# Patient Record
Sex: FEMALE | Race: OTHER | Employment: UNEMPLOYED | URBAN - METROPOLITAN AREA
[De-identification: names, ages, dates, MRNs, and addresses within clinical notes are randomized per-mention and may not be internally consistent; named-entity substitution may affect disease eponyms.]

---

## 2017-10-03 LAB
ANTIBODY SCREEN, EXTERNAL: NEGATIVE
CHLAMYDIA, EXTERNAL: NEGATIVE
HBSAG, EXTERNAL: NEGATIVE
HCT, EXTERNAL: 36.6
HGB, EXTERNAL: 12.4
HIV, EXTERNAL: NEGATIVE
N. GONORRHEA, EXTERNAL: NEGATIVE
RPR, EXTERNAL: NON REACTIVE
RUBELLA, EXTERNAL: NORMAL
TYPE, ABO & RH, EXTERNAL: NORMAL
URINALYSIS, EXTERNAL: NEGATIVE

## 2018-02-20 ENCOUNTER — OFFICE VISIT (OUTPATIENT)
Dept: ENDOCRINOLOGY | Age: 28
End: 2018-02-20

## 2018-02-20 VITALS
TEMPERATURE: 98 F | DIASTOLIC BLOOD PRESSURE: 65 MMHG | SYSTOLIC BLOOD PRESSURE: 99 MMHG | RESPIRATION RATE: 14 BRPM | OXYGEN SATURATION: 99 % | BODY MASS INDEX: 24.87 KG/M2 | HEIGHT: 60 IN | WEIGHT: 126.7 LBS | HEART RATE: 91 BPM

## 2018-02-20 DIAGNOSIS — O24.419 GESTATIONAL DIABETES MELLITUS (GDM) IN THIRD TRIMESTER, GESTATIONAL DIABETES METHOD OF CONTROL UNSPECIFIED: Primary | ICD-10-CM

## 2018-02-20 LAB
GLUCOSE POC: 78 MG/DL
HBA1C MFR BLD HPLC: 5.4 %

## 2018-02-20 NOTE — MR AVS SNAPSHOT
49 [facility] [address] 
[phone] Patient: Chao Wright 
MRN: [id number] TYM:3/06/9746 Visit Information Date & Time Provider Department Dept. Phone Encounter #  
 2/20/2018 11:00 AM Angel Mckenzie MD [facility] [phone] [id number] Follow-up Instructions Return in about 2 weeks (around 3/6/2018). Your Appointments 2/20/2018 11:00 AM  
New Patient with Angel Mckenzie MD  
[facility]) Appt Note: REF: Brim   [phone]        Gest DM  
 [address] [place] [address]  
[phone]  
  
   
 [address] [place] [address] Upcoming Health Maintenance Date Due DTaP/Tdap/Td series (1 - Tdap) 1/30/2012 PAP AKA CERVICAL CYTOLOGY 1/30/2012 Influenza Age 5 to Adult 8/1/2017 OB 3RD TRIMESTER TDAP 12/23/2017 Allergies as of 2/20/2018  Review Complete On: 2/20/2018 By: Angel Mckenzie MD  
 No Known Allergies Current Immunizations  Never Reviewed No immunizations on file. Not reviewed this visit You Were Diagnosed With   
  
 Codes Comments Gestational diabetes mellitus (GDM) in third trimester, gestational diabetes method of control unspecified    -  Primary ICD-10-CM: O23.80 ICD-9-CM: 037.23 Vitals BP Pulse Temp Resp Height(growth percentile) Weight(growth percentile) 99/65 (BP 1 Location: Left arm, BP Patient Position: Sitting) 91 98 °F (36.7 °C) (Oral) 14 5' (1.524 m) 126 lb 11.2 oz (57.5 kg) SpO2 BMI OB Status Smoking Status 99% 24.74 kg/m2 Pregnant Never Smoker Vitals History BMI and BSA Data Body Mass Index Body Surface Area 24.74 kg/m 2 1.56 m 2 Preferred Pharmacy Pharmacy Name Phone  [facility], [facility] [address] AT [address] [place] [address] Rd [address] ([address] [phone] Your Updated Medication List  
  
   
This list is accurate as of: 2/20/18 10:56 AM.  Always use your most recent med list.  
  
  
  
  
 PRENATAL DHA+COMPLETE PRENATAL -300 mg-mcg-mg Cmpk Generic drug:  [id number] lv-folic-dha Take  by mouth. We Performed the Following AMB POC GLUCOSE, QUANTITATIVE, BLOOD [57712 CPT(R)] AMB POC HEMOGLOBIN A1C [09563 CPT(R)] Follow-up Instructions Return in about 2 weeks (around 3/6/2018). Patient Instructions Goals for blood sugar: 
- fasting: less than 90 - 95 
 
- 2 hours after a meal: less than 120 Check blood sugars immediately before brekafast , 2 hour after the meals Introducing [facility] & HE[facility] SERVICES! [employer] introduces eTobb patient portal. Now you can access parts of your medical record, email your doctor's office, and request medication refills online. 1. In your internet browser, go to [email or web address] 2. Click on the First Time User? Click Here link in the Sign In box. You will see the New Member Sign Up page. 3. Enter your eTobb Access Code exactly as it appears below. You will not need to use this code after youve completed the sign-up process. If you do not sign up before the expiration date, you must request a new code. · eTobb Access Code: [email or web address] Expires: 5/21/2018 10:56 AM 
 
4. Enter the last four digits of your Social Security Number (xxxx) and Date of Birth (mm/dd/yyyy) as indicated and click Submit. You will be taken to the next sign-up page. 5. Create a Nimble TVt ID. This will be your eTobb login ID and cannot be changed, so think of one that is secure and easy to remember. 6. Create a eTobb password. You can change your password at any time. 7. Enter your Password Reset Question and Answer. This can be used at a later time if you forget your password. 8. Enter your e-mail address.  You will receive e-mail notification when new information is available in [address] 9. Click Sign Up. You can now view and download portions of your medical record. 10. Click the Download Summary menu link to download a portable copy of your medical information. If you have questions, please visit the Frequently Asked Questions section of the LayerVault website. Remember, LayerVault is NOT to be used for urgent needs. For medical emergencies, dial 911. Now available from your iPhone and Android! Please provide this summary of care documentation to your next provider. Your primary care clinician is listed as Hari Cee. If you have any questions after today's visit, please call [phone].

## 2018-02-20 NOTE — PATIENT INSTRUCTIONS
Goals for blood sugar:  - fasting: less than 90 - 95    - 2 hours after a meal: less than 120    Check blood sugars immediately before brekafast , 2 hour after the meals

## 2018-02-20 NOTE — PROGRESS NOTES
Bravo Culp is a 33 y.o. female here for   Chief Complaint   Patient presents with    New Patient     referred by Dr. Tiffanie Herrmann for GDM       1. Have you been to the ER, urgent care clinic since your last visit? Hospitalized since your last visit? -n/a    2. Have you seen or consulted any other health care providers outside of the [address] [place] [address] since your last visit?   Include any pap smears or colon screening.-n/a    Wt Readings from Last 3 Encounters:   No data found for Wt     Temp Readings from Last 3 Encounters:   No data found for Temp     BP Readings from Last 3 Encounters:   No data found for BP     Pulse Readings from Last 3 Encounters:   No data found for Pulse

## 2018-02-20 NOTE — PROGRESS NOTES
[facility]               Lyssa Olivares MD        [address] 78 [phone] Fax [phone] ( )          Patient Information  Date:2018  Name : Anupama Olivas 33 y.o.     YOB: 1990         Referred by: Ba Sparks MD       History of Present Illness: Anupama Olivas is a 33 y.o. female  here for management of Gestational DM. She is  ,27 weeks gestational age  She had gestational diabetes with her second pregnancy, diet controlled and the baby weight 7 pounds. She had abnormal 1 hour glucose tolerance test which was 146 followed by abnormal 3 hour OGTT. She has changed the diet,    No polyuria polydipsia. She is checking the blood glucose fasting and 2 hours after meal, fastings are less than 90 reportedly, 2 hours later blood glucose depends on what she eats. Past Medical History:   Diagnosis Date    Anxiety and depression        No Known Allergies    Current Outpatient Prescriptions   Medication Sig Dispense Refill    [id number] lv-folic-dha (PRENATAL DHA+COMPLETE PRENATAL) -300 mg-mcg-mg cmpk Take  by mouth.            Reviewed Family Hx,Social Hx  Review of Systems:  - Constitutional Symptoms: no fevers, chills, no weight loss  - Eyes: no blurry vision no double vision  - Cardiovascular: no chest pain or palpitations  - Respiratory: no cough no shortness of breath  - Gastrointestinal: no dysphagia no abdominal pain  - Musculoskeletal: no joint pains no  weakness  - Integumentary: no rashes  - Neurological: no numbness, tingling, no headaches  - Psychiatric: no depression no anxiety  - Endocrine: no heat or cold intolerance, no polyuria or polydipsia    Physical Examination:  Visit Vitals    BP 99/65 (BP 1 Location: Left arm, BP Patient Position: Sitting)    Pulse 91    Temp 98 °F (36.7 °C) (Oral)    Resp 14    Ht 5' (1.524 m)    Wt 126 lb 11.2 oz (57.5 kg)    SpO2 99%    BMI 24.74 kg/m2 -   - General: pleasant, no distress, good eye contact  - HEENT: no exopthalmos, no periorbital edema, EOMI, no lid lag or stare  - Neck: supple, no thyromegaly, masses  - Cardiovascular: regular, normal rate, normal S1 and S2, no murmurs  - Respiratory: clear to auscultation bilaterally  - Gastrointestinal: gravid abdomen  - Musculoskeletal: no proximal muscle weakness in upper or lower extremities  - Integumentary: no edema,   - Neurological:alert and oriented  - Psychiatric: normal mood and affect    Data Reviewed:     [] Glucose records reviewed. [] See glucose records for details (to be scanned). [x] A1C  [] Reviewed labs    Assessment/Plan: 1. Gestational diabetes    The significance and usual management of diabetes complicating pregnancy were discussed, including the increased risks of miscarriage,malformations,increased  section, macrosomia,  delivery, preeclampsia and infections. We discussed the importance of good glucose control to minimize these risks. We also discussed target glucoses and achieving a near normal hemoglobin A1c. Discussed about  checking blood sugars . she did not want dietitian class, understands the carbohydrate portions, she had nutritional class during her second pregnancy. Focused on the importance of checking the blood sugars and sending it to the office for further titration and better control. Thank you for allowing me to participate in the care of this patient.     Natalia Galvin MD

## 2018-03-01 LAB — GRBS, EXTERNAL: NEGATIVE

## 2018-03-07 ENCOUNTER — OFFICE VISIT (OUTPATIENT)
Dept: ENDOCRINOLOGY | Age: 28
End: 2018-03-07

## 2018-03-07 VITALS
HEIGHT: 60 IN | HEART RATE: 86 BPM | TEMPERATURE: 96.5 F | RESPIRATION RATE: 16 BRPM | SYSTOLIC BLOOD PRESSURE: 104 MMHG | WEIGHT: 130.3 LBS | OXYGEN SATURATION: 96 % | DIASTOLIC BLOOD PRESSURE: 60 MMHG | BODY MASS INDEX: 25.58 KG/M2

## 2018-03-07 DIAGNOSIS — O24.419 GESTATIONAL DIABETES MELLITUS (GDM) IN THIRD TRIMESTER, GESTATIONAL DIABETES METHOD OF CONTROL UNSPECIFIED: Primary | ICD-10-CM

## 2018-03-07 RX ORDER — ONDANSETRON HYDROCHLORIDE 8 MG/1
8 TABLET, FILM COATED ORAL
COMMUNITY
End: 2019-07-30 | Stop reason: ALTCHOICE

## 2018-03-07 NOTE — PROGRESS NOTES
Armand Siddiqui is a 33 y.o. female here for   Chief Complaint   Patient presents with    Gestational Diabetes       Functional glucose monitor and record keeping system? - yers  Eye exam within last year? - yes  Foot exam within last year? - yes    1. Have you been to the ER, urgent care clinic since your last visit? Hospitalized since your last visit? - no    2. Have you seen or consulted any other health care providers outside of the [address] [place] [address] since your last visit?   Include any pap smears or colon screening.- OB/GYN      Lab Results   Component Value Date/Time    Hemoglobin A1c (POC) 5.4 02/20/2018 10:36 AM       Wt Readings from Last 3 Encounters:   03/07/18 130 lb 4.8 oz (59.1 kg)   02/20/18 126 lb 11.2 oz (57.5 kg)     Temp Readings from Last 3 Encounters:   03/07/18 96.5 °F (35.8 °C) (Oral)   02/20/18 98 °F (36.7 °C) (Oral)     BP Readings from Last 3 Encounters:   03/07/18 104/60   02/20/18 99/65     Pulse Readings from Last 3 Encounters:   03/07/18 86   02/20/18 91

## 2018-03-07 NOTE — MR AVS SNAPSHOT
[address] [address] 
[phone] Patient: Billy Earsamantha 
MRN: [id number] YBC:1/81/7681 Visit Information Date & Time Provider Department Dept. Phone Encounter #  
 3/7/2018 11:45 AM Rosaura Ordonez MD [facility] [phone] [id number] Follow-up Instructions Return in about 4 months (around 7/7/2018). Upcoming Health Maintenance Date Due DTaP/Tdap/Td series (1 - Tdap) 1/30/2012 PAP AKA CERVICAL CYTOLOGY 1/30/2012 Influenza Age 5 to Adult 8/1/2017 OB 3RD TRIMESTER TDAP 12/23/2017 Allergies as of 3/7/2018  Review Complete On: 3/7/2018 By: Rosaura Ordonez MD  
 No Known Allergies Current Immunizations  Never Reviewed No immunizations on file. Not reviewed this visit Vitals BP Pulse Temp Resp Height(growth percentile) Weight(growth percentile) 104/60 (BP 1 Location: Left arm, BP Patient Position: Sitting) 86 96.5 °F (35.8 °C) (Oral) 16 5' (1.524 m) 130 lb 4.8 oz (59.1 kg) SpO2 BMI OB Status Smoking Status 96% 25.45 kg/m2 Pregnant Never Smoker BMI and BSA Data Body Mass Index Body Surface Area  
 25.45 kg/m 2 1.58 m 2 Preferred Pharmacy Pharmacy Name Phone [facility], [address] [phone] Your Updated Medication List  
  
   
This list is accurate as of 3/7/18 12:14 PM.  Always use your most recent med list.  
  
  
  
  
 PRENATAL DHA+COMPLETE PRENATAL -300 mg-mcg-mg Cmpk Generic drug:  [id number] lv-folic-dha Take  by mouth. ZOFRAN (AS HYDROCHLORIDE) 8 mg tablet Generic drug:  ondansetron hcl Take 8 mg by mouth every eight (8) hours as needed for Nausea. Follow-up Instructions Return in about 4 months (around 7/7/2018). Introducing \[facility]\"" & HEALTH SERVICES!    
 [employer] introduces [facility] patient portal. Now you can access parts of your medical record, email your doctor's office, and request medication refills online. 1. In your internet browser, go to [email or web address] 2. Click on the First Time User? Click Here link in the Sign In box. You will see the New Member Sign Up page. 3. Enter your [facility] Access Code exactly as it appears below. You will not need to use this code after youve completed the sign-up process. If you do not sign up before the expiration date, you must request a new code. · [facility] Access Code: [email or web address] Expires: 5/21/2018 10:56 AM 
 
4. Enter the last four digits of your Social Security Number (xxxx) and Date of Birth (mm/dd/yyyy) as indicated and click Submit. You will be taken to the next sign-up page. 5. Create a [facility] ID. This will be your [facility] login ID and cannot be changed, so think of one that is secure and easy to remember. 6. Create a [facility] password. You can change your password at any time. 7. Enter your Password Reset Question and Answer. This can be used at a later time if you forget your password. 8. Enter your e-mail address. You will receive e-mail notification when new information is available in [address] 9. Click Sign Up. You can now view and download portions of your medical record. 10. Click the Download Summary menu link to download a portable copy of your medical information. If you have questions, please visit the Frequently Asked Questions section of the [facility] website. Remember, [facility] is NOT to be used for urgent needs. For medical emergencies, dial 911. Now available from your iPhone and Android! Please provide this summary of care documentation to your next provider. Your primary care clinician is listed as Timothy Solano. If you have any questions after today's visit, please call [phone].

## 2018-03-07 NOTE — PROGRESS NOTES
[facility] ENDOCRINOLOGY               Vladimir Gonzalez MD        [address] 78 [phone] Fax [phone] ( )          Patient Information  Date:3/7/2018  Name : Arelis Santamaria 33 y.o.     YOB: 1990         Referred by: Ravi Bronson MD       History of Present Illness: Arelis Santamaria is a 33 y.o. female  here for management of Gestational DM. She is  ,40 weeks gestational age  ROB    She had gestational diabetes with her second pregnancy, diet controlled and the baby weight 7 pounds. She had abnormal 1 hour glucose tolerance test which was 146 followed by abnormal 3 hour OGTT.   checking the blood glucose and per recall fasting < 80 , 2 hrs post <110   She lost the logbook, eating healthy      No polyuria polydipsia. She is checking the blood glucose fasting and 2 hours after meal, fastings are less than 90 reportedly, 2 hours later blood glucose depends on what she eats. Past Medical History:   Diagnosis Date    Anxiety and depression        No Known Allergies    Current Outpatient Prescriptions   Medication Sig Dispense Refill    ondansetron hcl (ZOFRAN, AS HYDROCHLORIDE,) 8 mg tablet Take 8 mg by mouth every eight (8) hours as needed for Nausea.  [id number] lv-folic-dha (PRENATAL DHA+COMPLETE PRENATAL) -300 mg-mcg-mg cmpk Take  by mouth.            Reviewed Family Hx,Social Hx  Review of Systems:  - Constitutional Symptoms: no fevers, chills, no weight loss  - Eyes: no blurry vision no double vision  - Cardiovascular: no chest pain or palpitations  - Respiratory: no cough no shortness of breath  - Gastrointestinal: no dysphagia no abdominal pain  - Musculoskeletal: no joint pains no  weakness  - Integumentary: no rashes  - Neurological: no numbness, tingling, no headaches  - Psychiatric: no depression no anxiety  - Endocrine: no heat or cold intolerance, no polyuria or polydipsia    Physical Examination:  Visit Vitals    /60 (BP 1 Location: Left arm, BP Patient Position: Sitting)    Pulse 86    Temp 96.5 °F (35.8 °C) (Oral)    Resp 16    Ht 5' (1.524 m)    Wt 130 lb 4.8 oz (59.1 kg)    SpO2 96%    BMI 25.45 kg/m2     - General: pleasant, no distress, good eye contact  - HEENT: no exopthalmos, no periorbital edema, EOMI, no lid lag or stare  - Neck: supple, no thyromegaly, masses  - Cardiovascular: regular, normal rate, normal S1 and S2, no murmurs  - Respiratory: clear to auscultation bilaterally  - Gastrointestinal: gravid abdomen  - Musculoskeletal: no proximal muscle weakness in upper or lower extremities  - Integumentary: no edema,   - Neurological:alert and oriented  - Psychiatric: normal mood and affect    Data Reviewed:     [] Glucose records reviewed. [] See glucose records for details (to be scanned). [x] A1C  [] Reviewed labs    Assessment/Plan: 1. Gestational diabetes  Diet controlled  Home blood glucose monitoring at goal  Weekly blood glucose log  Follow-up in 3 months after pregnancy          Thank you for allowing me to participate in the care of this patient.     Emily Yee MD

## 2018-03-21 ENCOUNTER — ANESTHESIA (OUTPATIENT)
Dept: LABOR AND DELIVERY | Age: 28
DRG: 560 | End: 2018-03-21
Payer: COMMERCIAL

## 2018-03-21 ENCOUNTER — HOSPITAL ENCOUNTER (INPATIENT)
Age: 28
LOS: 2 days | Discharge: HOME | DRG: 560 | End: 2018-03-23
Attending: OBSTETRICS & GYNECOLOGY | Admitting: OBSTETRICS & GYNECOLOGY
Payer: COMMERCIAL

## 2018-03-21 ENCOUNTER — ANESTHESIA EVENT (OUTPATIENT)
Dept: LABOR AND DELIVERY | Age: 28
DRG: 560 | End: 2018-03-21
Payer: COMMERCIAL

## 2018-03-21 DIAGNOSIS — Z34.90 PREGNANCY, UNSPECIFIED GESTATIONAL AGE: Primary | ICD-10-CM

## 2018-03-21 LAB
ERYTHROCYTE [DISTWIDTH] IN BLOOD BY AUTOMATED COUNT: 12.9 % (ref 11.5–14.5)
GLUCOSE BLD STRIP.AUTO-MCNC: 130 MG/DL (ref 65–100)
GLUCOSE BLD STRIP.AUTO-MCNC: 78 MG/DL (ref 65–100)
HCT VFR BLD AUTO: 37.5 % (ref 35–47)
HGB BLD-MCNC: 12.8 G/DL (ref 11.5–16)
MCH RBC QN AUTO: 30.8 PG (ref 26–34)
MCHC RBC AUTO-ENTMCNC: 34.1 G/DL (ref 30–36.5)
MCV RBC AUTO: 90.4 FL (ref 80–99)
NRBC # BLD: 0 K/UL (ref 0–0.01)
NRBC BLD-RTO: 0 PER 100 WBC
PLATELET # BLD AUTO: 197 K/UL (ref 150–400)
PMV BLD AUTO: 11.1 FL (ref 8.9–12.9)
RBC # BLD AUTO: 4.15 M/UL (ref 3.8–5.2)
SERVICE CMNT-IMP: ABNORMAL
SERVICE CMNT-IMP: NORMAL
WBC # BLD AUTO: 7.6 K/UL (ref 3.6–11)

## 2018-03-21 PROCEDURE — 74011000250 HC RX REV CODE- 250

## 2018-03-21 PROCEDURE — 74011250636 HC RX REV CODE- 250/636: Performed by: OBSTETRICS & GYNECOLOGY

## 2018-03-21 PROCEDURE — 0KQM0ZZ REPAIR PERINEUM MUSCLE, OPEN APPROACH: ICD-10-PCS | Performed by: OBSTETRICS & GYNECOLOGY

## 2018-03-21 PROCEDURE — 75410000000 HC DELIVERY VAGINAL/SINGLE: Performed by: OBSTETRICS & GYNECOLOGY

## 2018-03-21 PROCEDURE — 85027 COMPLETE CBC AUTOMATED: CPT | Performed by: OBSTETRICS & GYNECOLOGY

## 2018-03-21 PROCEDURE — 3E033VJ INTRODUCTION OF OTHER HORMONE INTO PERIPHERAL VEIN, PERCUTANEOUS APPROACH: ICD-10-PCS | Performed by: OBSTETRICS & GYNECOLOGY

## 2018-03-21 PROCEDURE — 77030011943

## 2018-03-21 PROCEDURE — 77030018749 HC HK AMNIO DISP DERY -A

## 2018-03-21 PROCEDURE — 10907ZC DRAINAGE OF AMNIOTIC FLUID, THERAPEUTIC FROM PRODUCTS OF CONCEPTION, VIA NATURAL OR ARTIFICIAL OPENING: ICD-10-PCS | Performed by: OBSTETRICS & GYNECOLOGY

## 2018-03-21 PROCEDURE — 36415 COLL VENOUS BLD VENIPUNCTURE: CPT | Performed by: OBSTETRICS & GYNECOLOGY

## 2018-03-21 PROCEDURE — 77030014125 HC TY EPDRL BBMI -B: Performed by: ANESTHESIOLOGY

## 2018-03-21 PROCEDURE — 75410000002 HC LABOR FEE PER 1 HR: Performed by: OBSTETRICS & GYNECOLOGY

## 2018-03-21 PROCEDURE — 82962 GLUCOSE BLOOD TEST: CPT

## 2018-03-21 PROCEDURE — 74011250637 HC RX REV CODE- 250/637

## 2018-03-21 PROCEDURE — 76060000078 HC EPIDURAL ANESTHESIA: Performed by: NURSE ANESTHETIST, CERTIFIED REGISTERED

## 2018-03-21 PROCEDURE — 74011250637 HC RX REV CODE- 250/637: Performed by: OBSTETRICS & GYNECOLOGY

## 2018-03-21 PROCEDURE — 75410000003 HC RECOV DEL/VAG/CSECN EA 0.5 HR: Performed by: OBSTETRICS & GYNECOLOGY

## 2018-03-21 PROCEDURE — 65270000029 HC RM PRIVATE

## 2018-03-21 PROCEDURE — 00HU33Z INSERTION OF INFUSION DEVICE INTO SPINAL CANAL, PERCUTANEOUS APPROACH: ICD-10-PCS | Performed by: NURSE ANESTHETIST, CERTIFIED REGISTERED

## 2018-03-21 PROCEDURE — 74011250636 HC RX REV CODE- 250/636: Performed by: ANESTHESIOLOGY

## 2018-03-21 PROCEDURE — 4A1HXCZ MONITORING OF PRODUCTS OF CONCEPTION, CARDIAC RATE, EXTERNAL APPROACH: ICD-10-PCS | Performed by: OBSTETRICS & GYNECOLOGY

## 2018-03-21 RX ORDER — SODIUM CHLORIDE, SODIUM LACTATE, POTASSIUM CHLORIDE, CALCIUM CHLORIDE 600; 310; 30; 20 MG/100ML; MG/100ML; MG/100ML; MG/100ML
125 INJECTION, SOLUTION INTRAVENOUS CONTINUOUS
Status: DISCONTINUED | OUTPATIENT
Start: 2018-03-21 | End: 2018-03-21

## 2018-03-21 RX ORDER — OXYTOCIN/RINGER'S LACTATE 20/1000 ML
125-500 PLASTIC BAG, INJECTION (ML) INTRAVENOUS ONCE
Status: ACTIVE | OUTPATIENT
Start: 2018-03-21 | End: 2018-03-22

## 2018-03-21 RX ORDER — ONDANSETRON 2 MG/ML
4 INJECTION INTRAMUSCULAR; INTRAVENOUS
Status: DISCONTINUED | OUTPATIENT
Start: 2018-03-21 | End: 2018-03-23 | Stop reason: HOSPADM

## 2018-03-21 RX ORDER — LIDOCAINE HYDROCHLORIDE 10 MG/ML
10 INJECTION INFILTRATION; PERINEURAL ONCE
Status: DISCONTINUED | OUTPATIENT
Start: 2018-03-21 | End: 2018-03-21

## 2018-03-21 RX ORDER — HYDROMORPHONE HYDROCHLORIDE 2 MG/ML
1 INJECTION, SOLUTION INTRAMUSCULAR; INTRAVENOUS; SUBCUTANEOUS
Status: DISCONTINUED | OUTPATIENT
Start: 2018-03-21 | End: 2018-03-23 | Stop reason: HOSPADM

## 2018-03-21 RX ORDER — FENTANYL/BUPIVACAINE/NS/PF 2-1250MCG
1-16 PREFILLED PUMP RESERVOIR EPIDURAL CONTINUOUS
Status: DISCONTINUED | OUTPATIENT
Start: 2018-03-21 | End: 2018-03-21

## 2018-03-21 RX ORDER — HYDROCORTISONE ACETATE PRAMOXINE HCL 2.5; 1 G/100G; G/100G
CREAM TOPICAL AS NEEDED
Status: DISCONTINUED | OUTPATIENT
Start: 2018-03-21 | End: 2018-03-23 | Stop reason: HOSPADM

## 2018-03-21 RX ORDER — NALOXONE HYDROCHLORIDE 0.4 MG/ML
0.4 INJECTION, SOLUTION INTRAMUSCULAR; INTRAVENOUS; SUBCUTANEOUS AS NEEDED
Status: DISCONTINUED | OUTPATIENT
Start: 2018-03-21 | End: 2018-03-23 | Stop reason: HOSPADM

## 2018-03-21 RX ORDER — IBUPROFEN 800 MG/1
800 TABLET ORAL EVERY 8 HOURS
Status: DISCONTINUED | OUTPATIENT
Start: 2018-03-21 | End: 2018-03-23 | Stop reason: HOSPADM

## 2018-03-21 RX ORDER — SIMETHICONE 80 MG
80 TABLET,CHEWABLE ORAL
Status: DISCONTINUED | OUTPATIENT
Start: 2018-03-21 | End: 2018-03-23 | Stop reason: HOSPADM

## 2018-03-21 RX ORDER — DOCUSATE SODIUM 100 MG/1
100 CAPSULE, LIQUID FILLED ORAL
Status: DISCONTINUED | OUTPATIENT
Start: 2018-03-21 | End: 2018-03-23 | Stop reason: HOSPADM

## 2018-03-21 RX ORDER — EPHEDRINE SULFATE 50 MG/ML
10 INJECTION, SOLUTION INTRAVENOUS
Status: DISCONTINUED | OUTPATIENT
Start: 2018-03-21 | End: 2018-03-21

## 2018-03-21 RX ORDER — BUPIVACAINE HYDROCHLORIDE 2.5 MG/ML
INJECTION, SOLUTION EPIDURAL; INFILTRATION; INTRACAUDAL AS NEEDED
Status: DISCONTINUED | OUTPATIENT
Start: 2018-03-21 | End: 2018-03-21 | Stop reason: HOSPADM

## 2018-03-21 RX ORDER — NALOXONE HYDROCHLORIDE 0.4 MG/ML
0.4 INJECTION, SOLUTION INTRAMUSCULAR; INTRAVENOUS; SUBCUTANEOUS AS NEEDED
Status: DISCONTINUED | OUTPATIENT
Start: 2018-03-21 | End: 2018-03-21 | Stop reason: SDUPTHER

## 2018-03-21 RX ORDER — SODIUM CHLORIDE 0.9 % (FLUSH) 0.9 %
5-10 SYRINGE (ML) INJECTION EVERY 8 HOURS
Status: DISCONTINUED | OUTPATIENT
Start: 2018-03-21 | End: 2018-03-21

## 2018-03-21 RX ORDER — OXYTOCIN IN 5 % DEXTROSE 30/500 ML
1-25 PLASTIC BAG, INJECTION (ML) INTRAVENOUS
Status: DISCONTINUED | OUTPATIENT
Start: 2018-03-21 | End: 2018-03-21

## 2018-03-21 RX ORDER — OXYCODONE AND ACETAMINOPHEN 5; 325 MG/1; MG/1
1 TABLET ORAL
Status: DISCONTINUED | OUTPATIENT
Start: 2018-03-21 | End: 2018-03-23 | Stop reason: HOSPADM

## 2018-03-21 RX ORDER — DIPHENHYDRAMINE HCL 25 MG
25 CAPSULE ORAL
Status: DISCONTINUED | OUTPATIENT
Start: 2018-03-21 | End: 2018-03-23 | Stop reason: HOSPADM

## 2018-03-21 RX ORDER — PEPPERMINT OIL
SPIRIT ORAL
Status: DISCONTINUED
Start: 2018-03-21 | End: 2018-03-21

## 2018-03-21 RX ORDER — NALOXONE HYDROCHLORIDE 0.4 MG/ML
0.4 INJECTION, SOLUTION INTRAMUSCULAR; INTRAVENOUS; SUBCUTANEOUS AS NEEDED
Status: DISCONTINUED | OUTPATIENT
Start: 2018-03-21 | End: 2018-03-21

## 2018-03-21 RX ORDER — SODIUM CHLORIDE 0.9 % (FLUSH) 0.9 %
5-10 SYRINGE (ML) INJECTION AS NEEDED
Status: DISCONTINUED | OUTPATIENT
Start: 2018-03-21 | End: 2018-03-21

## 2018-03-21 RX ADMIN — SODIUM CHLORIDE, POTASSIUM CHLORIDE, SODIUM LACTATE AND CALCIUM CHLORIDE 999 ML/HR: 600; 310; 30; 20 INJECTION, SOLUTION INTRAVENOUS at 10:20

## 2018-03-21 RX ADMIN — Medication 2 MILLI-UNITS/MIN: at 07:23

## 2018-03-21 RX ADMIN — Medication: at 12:20

## 2018-03-21 RX ADMIN — IBUPROFEN 800 MG: 800 TABLET ORAL at 23:41

## 2018-03-21 RX ADMIN — BUPIVACAINE HYDROCHLORIDE 5 ML: 2.5 INJECTION, SOLUTION EPIDURAL; INFILTRATION; INTRACAUDAL at 10:38

## 2018-03-21 RX ADMIN — DOCUSATE SODIUM 100 MG: 100 CAPSULE, LIQUID FILLED ORAL at 15:10

## 2018-03-21 RX ADMIN — IBUPROFEN 800 MG: 800 TABLET ORAL at 15:10

## 2018-03-21 RX ADMIN — Medication 8 ML/HR: at 10:35

## 2018-03-21 RX ADMIN — SODIUM CHLORIDE, POTASSIUM CHLORIDE, SODIUM LACTATE AND CALCIUM CHLORIDE 125 ML/HR: 600; 310; 30; 20 INJECTION, SOLUTION INTRAVENOUS at 06:30

## 2018-03-21 NOTE — PROGRESS NOTES
07: 08AM  Bedside and Verbal shift change report given to Alva Prasad RN (oncoming nurse) by Aide Wilhelm RN (offgoing nurse). Report included the following information SBAR, Kardex, Procedure Summary, Intake/Output, MAR, Recent Results and Med Rec Status. Assumed care of the pt.     8:50 AM  Dr. Olivia Rose in room to assess the pt. SVe performed and pt is 3-4/50/-2, AROM with moderate amount of clear fluid noted, AROM by Dr. Olivia Rose. Patient to have q2hrs accucheck beginning at 10:00AM per Dr. Olivia Rose. Will continue to monitor pt.     10:45 PM  Epidural placed by Yair Davila CRNA; pt now reports pain relief. Will continue to monitor pt.     12:22 PM   of viable male baby by Dr. Olivia Rose at 12:22PM, robust cry heard on delivery, Apgars 9/9. Baby immediately placed skin to skin on the pt.     3:49 PM  TRANSFER - OUT REPORT:    Verbal report given to Raiza Broderick RN(name) on Jory Castro  being transferred to MIU(unit) for routine progression of care       Report consisted of patients Situation, Background, Assessment and   Recommendations(SBAR). Information from the following report(s) SBAR, Kardex, Procedure Summary, Intake/Output, MAR, Recent Results and Med Rec Status was reviewed with the receiving nurse. Lines:   Peripheral IV 18 Right Hand (Active)   Site Assessment Clean, dry, & intact 3/21/2018  7:34 AM   Phlebitis Assessment 0 3/21/2018  7:34 AM   Infiltration Assessment 0 3/21/2018  7:34 AM   Dressing Status Clean, dry, & intact 3/21/2018  7:34 AM   Dressing Type Transparent 3/21/2018  7:34 AM   Hub Color/Line Status Patent; Infusing;Flushed 3/21/2018  7:34 AM   Action Taken Open ports on tubing capped 3/21/2018  7:34 AM   Alcohol Cap Used Yes 3/21/2018  7:34 AM        Opportunity for questions and clarification was provided.   [place] ID bands confirmed with Raiza Broderick RN    Patient transported with all belongings and with baby via open crib:   Registered Nurse  Tech

## 2018-03-21 NOTE — ANESTHESIA PREPROCEDURE EVALUATION
Anesthetic History   No history of anesthetic complications            Review of Systems / Medical History  Patient summary reviewed, nursing notes reviewed and pertinent labs reviewed    Pulmonary  Within defined limits                 Neuro/Psych              Cardiovascular  Within defined limits                     GI/Hepatic/Renal  Within defined limits              Endo/Other    Diabetes: well controlled         Other Findings              Physical Exam    Airway  Mallampati: II  TM Distance: 4 - 6 cm  Neck ROM: normal range of motion   Mouth opening: Normal     Cardiovascular  Regular rate and rhythm,  S1 and S2 normal,  no murmur, click, rub, or gallop  Rhythm: regular  Rate: normal         Dental  No notable dental hx       Pulmonary                 Abdominal  Abdominal exam normal       Other Findings            Anesthetic Plan    ASA: 2  Anesthesia type: epidural            Anesthetic plan and risks discussed with: Patient

## 2018-03-21 NOTE — H&P
History & Physical    Name: Bettina Dewey MRN: [id number]  SSN: [id number]    YOB: 1990  Age: 33 y.o. Sex: female      Subjective:     Estimated Date of Delivery: 3/24/18  OB History    Para Term  AB Living   3 2    2   SAB TAB Ectopic Molar Multiple Live Births              # Outcome Date GA Lbr Randy/2nd Weight Sex Delivery Anes PTL Lv   3 Current            2 Para            1 Para                   Ms. Malone Most is admitted with pregnancy at 39w4d for induction of labor due to gestational diabetes. Prenatal course was complicated by diabetes - gestational.  Please see prenatal records for details. Past Medical History:   Diagnosis Date    Anxiety and depression     taking medication prior to pregnancy    Gestational diabetes     Postpartum depression ,     both previous babies, was treated with medication both times     History reviewed. No pertinent surgical history. Social History     Occupational History    Not on file. Social History Main Topics    Smoking status: Never Smoker    Smokeless tobacco: Never Used    Alcohol use No    Drug use: No    Sexual activity: Yes     Partners: Male     Birth control/ protection: None     Family History   Problem Relation Age of Onset    Hypertension Mother     Hypertension Maternal Grandmother        No Known Allergies  Prior to Admission medications    Medication Sig Start Date End Date Taking? Authorizing Provider   [id number] lv-folic-dha (PRENATAL DHA+COMPLETE PRENATAL) [id number] mg-mcg-mg cmpk Take  by mouth. Yes Historical Provider   ondansetron hcl (ZOFRAN, AS HYDROCHLORIDE,) 8 mg tablet Take 8 mg by mouth every eight (8) hours as needed for Nausea. Historical Provider        Review of Systems: A comprehensive review of systems was negative except for that written in the History of Present Illness.     Objective:     Vitals:  Vitals:    18 0721 18 0726 18 0730 18 0731   BP:   108/63 Pulse:   91    Resp:   14    Temp:   98.3 °F (36.8 °C)    SpO2: 100% 98%  99%   Weight:       Height:            Physical Exam:  Patient without distress. Heart: Regular rate and rhythm  Lung: normal respiratory effort  Abdomen: soft, nontender  Fundus: soft and non tender  Perineum: blood absent, amniotic fluid absent  Cervical Exam: 3-4/50/-2  Lower Extremities: WWP   Membranes:  Artificial Rupture of Membranes; Amniotic Fluid: small amount of clear fluid  Fetal Heart Rate: Reactive          Prenatal Labs:   Lab Results   Component Value Date/Time    Rubella, External immune 10/03/2017    HBsAg, External negative 10/03/2017    HIV, External negative 10/03/2017    RPR, External non reactive 10/03/2017    Gonorrhea, External negative 10/03/2017    Chlamydia, External negative 10/03/2017    ABO,Rh O positive 10/03/2017    GrBStrep, External NEGATIVE 2018       Impression/Plan:     Active Problems:    Pregnancy (3/21/2018)         Plan: Admit for induction of labor. Group B Strep negative.   Pit per protocol   [facility]/[place]  Consented  IVF/Clears  Pain management if desired   Expect    Accuchecks     Signed By:  Dennis Campa MD     2018

## 2018-03-21 NOTE — DISCHARGE INSTRUCTIONS
Discharge Instructions for Vaginal Delivery    Patient ID:  Olan Severe  [id number]  33 y.o.  1990    Take Home Medications       Continue taking your prenatal vitamins if you are breastfeeding. Follow-up care is a key part of your treatment and safety. Please schedule and keep appointments. Follow-up with your primary OB in 6 weeks. Activity  Avoid anything in your vagina for 6 weeks (no intercourse, tampons, or douching). You may drive unless you are taking prescription pain medications. Climbing stairs and light lifting are okay. Please avoid excessive exercise, though walking is okay- you'll be tired! Diet  Regular diet as tolerated. Be sure to drink plenty of fluids if you are breastfeeding. Wound care  If you have stitches, continue to rinse with a squirt bottle of warm water each time you void for about 7-10 days. .  Your stitches will gradually dissolve over four to eight weeks. Sitz baths are also helpful to keep the wound clean, encourage healing, and to help with pain associated with the stitches or hemorrhoids. You can use either a sitz bath basin or a bathtub filled with 2-3\" inches of plain warm water. Soak for 10 minutes 3 times a day as tolerated. Pain Management  1. Over the counter medications such as Tylenol and ibuprofen (Motrin or Advil) are ideal.  These may be taken together, alternating doses. You may  take the maximum dose:  Motrin or Advil (generic ibuprofen), either 3 tablets every 6 hours or 4 tablets every 8 hours or Tylenol (acetominophen) 1000mg every 6 hours (equivalent to 2 extra strength Tylenol). 2. You may also have a precrescription for stronger pain medication. Take only as needed and transition to over the counter medication in the next few days. Minimize amounts of the prescription medication, as it can be habit-forming and will worsen or cause constipation.  Most patients will find that within a couple of days, their pain is adequately controlled using only over-the-counter medications. 3. The prescription pain medication is mixed with Tylenol, therefore, you should not take any extra Tylenol or acetaminophen until you have reduced your prescription pain medication. 4. Add heating pad or sitz baths as needed. Add hemorrhoid wipes or ointments if needed    Constipation  1. Constipation is normal after pregnancy and delivery, especially while taking prescription narcotic pain medication. 2. Over the counter remedies including ducosate (Colace), take 1-2 capsules 1-2 times daily for soft stool as needed. You may also add/ try milk of magnesia or rectal remedies such as Dulcolax or Fleets enema. Recovery: What to Expect at Home  1. Fatigue is expected. Try to rest when you can and don't worry about doing housework or other tasks which can wait. 2. The soreness along your bottom will improve significantly over the first 2 weeks, but it may take 6 weeks before you are completely recovered. 3. Back pain or general body aches or muscle soreness are expected and should improve with acetominophen or ibuprofen. 4. Leg swelling due to pregnancy and/or IV fluids given in the hospital will take about two weeks to resolve. 5. Most women experience some form of the \"Baby Blues\" after having a baby. Feeling emotional, tearful, frustrated, anxious, sad, and irritable some of the time is normal and go away after about 2 weeks. Adequate rest and help from your family will help. Take breaks from caring for the baby. Call your doctor if your symptoms seem severe, last more than 2 weeks, or seem to be getting worse instead of better. Get help immediately if you have thoughts of wanting to hurt yourself or others! Call your doctor or seek immediate medical care if you have:  Heavy vaginal bleeding, soaking through one or more pads an hour for several hours. Foul-smelling discharge from your vagina or incision.   Consistent nausea and vomiting and cannot keep fluids down. Consistent pain that does not get better after you take pain medicine. Sudden chest pain and shortness of breath  Signs of a blood clot: pain/ swelling/ increasing redness in your lower extremeties  Signs of infection: increased pain in your abdomen or vaginal area; red streaks, warmth, or tenderness of your breasts; fever of 100.5 F or greater   Breast Engorgement: Care Instructions  Your Care Instructions    Breast engorgement is the painful overfilling of the breasts that can occur during breastfeeding. It usually occurs when your breasts make more milk than your baby can drink or when you are unable to breastfeed or pump. It also happens when you stop breastfeeding your baby. Breast engorgement can make it hard for your baby to latch on to your nipple. Your baby may then be unable to breastfeed. This makes the problem worse. If you are breastfeeding, engorgement should get better in 12 to 24 hours. And it should disappear within a few days. If you are not breastfeeding, you will get better in 1 to 5 days or when your body stops making breast milk. Follow-up care is a key part of your treatment and safety. Be sure to make and go to all appointments, and call your doctor if you are having problems. It's also a good idea to know your test results and keep a list of the medicines you take. How can you care for yourself at home? · If your doctor gave you medicine, take it exactly as prescribed. Call your doctor if you think you are having a problem with your medicine. · Take an over-the-counter pain medicine, such as acetaminophen (Tylenol), ibuprofen (Advil, Motrin), or naproxen (Aleve). Be safe with medicines. Read and follow all instructions on the label. · Do not take two or more pain medicines at the same time unless the doctor told you to. Many pain medicines have acetaminophen, which is Tylenol. Too much acetaminophen (Tylenol) can be harmful.   · If your baby is having a hard time latching on, let out (express) a small amount of milk with your hands or a pump. This will help soften your nipple and make it easier for your baby to latch on.  · If your breasts are uncomfortably full, pump or express breast milk by hand just until they are comfortable. Do not empty your breasts all the way. Releasing a lot of milk will cause your body to produce larger amounts of milk. This can make breast engorgement worse. · Gently massage your breasts to help milk flow during breastfeeding or pumping. · Apply a frozen wet towel, cold gel or ice packs, or bags of frozen vegetables to your breasts for 15 minutes at a time every hour as needed. (Put a thin cloth between the ice pack and your skin.)  · Avoid tight bras that press on your breasts. A tight bra can cause blocked milk ducts. To prevent breast engorgement  · Put a warm, wet washcloth on your breasts before breastfeeding. This may help your breasts \"let down,\" increasing the flow of milk. Or you can take a warm shower or use a heating pad set on low. (Never use a heating pad in bed, because you may fall asleep and burn yourself.)  · Change your baby's position occasionally to make sure that all parts of your breasts are emptied. · Make sure your baby is latched on properly. · Talk to your doctor or a lactation consultant about any problems you have with breastfeeding. When should you call for help? Call your doctor now or seek immediate medical care if:  ? · You have symptoms of a breast infection, such as:  ¨ Increased pain, swelling, redness, or warmth around a breast.  ¨ Red streaks extending from the breast.  ¨ Pus draining from a breast.  ¨ A fever. ? Watch closely for changes in your health, and be sure to contact your doctor if:  ? · You do not get better as expected. Where can you learn more? Go to http://geraldine-mac.info/.   Enter F017 in the search box to learn more about \"Breast Engorgement: Care Instructions. \"  Current as of: March 16, 2017  Content Version: 11.4  © 8610-8023 [employer]. Care instructions adapted under license by [employer] (which disclaims liability or warranty for this information). If you have questions about a medical condition or this instruction, always ask your healthcare professional. [address] any warranty or liability for your use of this information. Breastfeeding: Care Instructions      Your Care Instructions  Breastfeeding has many benefits. It may lower your baby's chances of getting an infection. It also may prevent your baby from having problems such as diabetes and high cholesterol later in life. Breastfeeding also helps you bond with your baby. The American Academy of Pediatrics recommends breastfeeding for at least a year. That may be very hard for many women to do, but breastfeeding even for a shorter period of time is a health benefit to you and your baby. In the first days after birth, your breasts make a thick, yellow liquid called colostrum. This liquid gives your baby nutrients and antibodies against infection. It is all that babies need in the first days after birth. Your breasts will fill with milk a few days after the birth. Breastfeeding is a skill that gets better with practice. It is common to have some problems. Some women have sore or cracked nipples, blocked milk ducts, or a breast infection (mastitis). But if you feed your baby every 1 to 2 hours during the day and follow the tips on this sheet, you may not have these problems. You can treat these problems if they happen and continue breastfeeding. Follow-up care is a key part of your treatment and safety. Be sure to make and go to all appointments, and call your doctor if you are having problems. It's also a good idea to know your test results and keep a list of the medicines you take. How can you care for yourself at home?   · Breastfeed your baby whenever he or she is hungry. In the first 2 weeks, your baby will feed about every 1 to 3 hours. This will help you keep up your supply of milk. · Put a bed pillow or a nursing pillow on your lap to support your arms and your baby. · Hold your baby in a comfortable position. ¨ You can hold your baby in several ways. One of the most common positions is the cradle hold. One arm supports your baby, with his or her head in the bend of your elbow. Your open hand supports your baby's bottom or back. Your baby's belly lies against yours. ¨ If you had your baby by , or , try the football hold. This position keeps your baby off your belly. Tuck your baby under your arm, with his or her body along the side you will be feeding on. Support your baby's upper body with your arm. With that hand you can control your baby's head to bring his or her mouth to your breast.  ¨ Try different positions with each feeding. If you are having problems, ask for help from your doctor or a lactation consultant. · To get your baby to latch on:  ¨ Support and narrow your breast with one hand using a \"U hold,\" with your thumb on the outer side of your breast and your fingers on the inner side. You can also use a \"C hold,\" with all your fingers below the nipple and your thumb above it. Try the different holds to get the deepest latch for whichever breastfeeding position you use. Your other arm is behind your baby's back, with your hand supporting the base of the baby's head. Position your fingers and thumb to point toward your baby's ears. ¨ You can touch your baby's lower lip with your nipple to get your baby to open his or her mouth. Wait until your baby opens up really wide, like a big yawn. Then be sure to bring the baby quickly to your breast-not your breast to the baby. As you bring your baby toward your breast, use your other hand to support the breast and guide it into his or her mouth.   ¨ Both the nipple and a large portion of the darker area around the nipple (areola) should be in the baby's mouth. The baby's lips should be flared outward, not folded in (inverted). ¨ Listen for a regular sucking and swallowing pattern while the baby is feeding. If you cannot see or hear a swallowing pattern, watch the baby's ears, which will wiggle slightly when the baby swallows. If the baby's nose appears to be blocked by your breast, tilt the baby's head back slightly, so just the edge of one nostril is clear for breathing. ¨ When your baby is latched, you can usually remove your hand from supporting your breast and bring it under your baby to cradle him or her. Now just relax and breastfeed your baby. · You will know that your baby is feeding well when:  ¨ His or her mouth covers a lot of the areola, and the lips are flared out. ¨ His or her chin and nose rest against your breast.  ¨ Sucking is deep and rhythmic, with short pauses. ¨ You are able to see and hear your baby swallowing. ¨ You do not feel pain in your nipple. · If your baby takes only one breast at a feeding, start the next feeding on the other breast.  · Anytime you need to remove your baby from the breast, put one finger in the corner of his or her mouth. Push your finger between your baby's gums to gently break the seal. If you do not break the tight seal before you remove your baby, your nipples can become sore, cracked, or bruised. · After feeding your baby, gently pat his or her back to let out any swallowed air. After your baby burps, offer the breast again, or offer the other breast. Sometimes a baby will want to keep feeding after being burped. When should you call for help? Call your doctor now or seek immediate medical care if:  ? · You have symptoms of a breast infection, such as:  ¨ Increased pain, swelling, redness, or warmth around a breast.  ¨ Red streaks extending from the breast.  ¨ Pus draining from a breast.  ¨ A fever.    ? · Your baby has no wet diapers for 6 hours. ? Watch closely for changes in your health, and be sure to contact your doctor if:  ? · Your baby has trouble latching on to your breast.   ? · You continue to have pain or discomfort when breastfeeding. ? · You have other questions or concerns. Where can you learn more? Go to http://geraldine-mac.info/. Enter P492 in the search box to learn more about \"Breastfeeding: Care Instructions. \"  Current as of: March 16, 2017  Content Version: 11.4  © 5388-9024 [employer]. Care instructions adapted under license by [employer] (which disclaims liability or warranty for this information). If you have questions about a medical condition or this instruction, always ask your healthcare professional. [address] any warranty or liability for your use of this information. Nutrition for Breastfeeding Mothers: Care Instructions  Your Care Instructions    When a woman breastfeeds her baby, she needs more nutrients to keep herself healthy and to make the baby's milk. Breastfeeding helps build the bond between you and your baby. It gives your baby excellent health benefits. A healthy diet includes eating a variety of foods from the basic food groups: grains, vegetables, fruits, milk and milk products (such as cheese and yogurt), and meat and dried beans. Eating well during breastfeeding will ensure that you stay healthy and your baby grows and develops normally. Follow-up care is a key part of your treatment and safety. Be sure to make and go to all appointments, and call your doctor if you are having problems. It's also a good idea to know your test results and keep a list of the medicines you take. How can you care for yourself at home? · Include 3 to 4 cups of nonfat or low-fat milk or milk products in your diet every day. These include:  ¨ Milk (8 ounces equals 1 cup). ¨ Ice cream (1½ cups equals 1 cup of milk).   ¨ Cheese (1½ ounces of cheese equals 1 cup). ¨ Yogurt (8 ounces equals 1 cup). · Eat at least 7 ounces of grains, such as cereals, breads, crackers, rice, or pasta, every day. One ounce is about 1 slice of bread, 1 cup of breakfast cereal, or ½ cup of cooked rice, cereal, or pasta. · Eat 3 cups of vegetables each day. Choices include:  ¨ Dark-green vegetables such as broccoli and spinach. ¨ Orange vegetables such as carrots and sweet potatoes. ¨ Dried beans (such as aranda and kidney beans) and peas (such as lentils). · Every day, eat 2 cups of fresh, frozen, or canned fruit. · Eat 6½ ounces each day of protein, such as chicken, fish, lean meat, eggs, peanut butter, dried beans and peas, nuts, and seeds. One egg, 1 tablespoon of peanut butter, or ½ ounce of nuts or seeds equals 1 ounce of protein. A ½ cup of cooked beans equals 2 ounces of protein. · Drink plenty of fluids, enough so that your urine is light yellow or clear like water. If you have kidney, heart, or liver disease and have to limit fluids, talk with your doctor before you increase the amount of fluids you drink. · Limit caffeine products, such as coffee, tea, chocolate, and some sodas. Caffeine can pass to your baby through breast milk. It may cause fussiness and sleep problems in babies. · Your doctor may recommend a vitamin supplement. Take it as recommended. · Consider joining a breastfeeding support group. These are offered at many hospitals and birthing centers by nurses, nurse-midwives, or lactation consultants. When should you call for help? Watch closely for changes in your health, and be sure to contact your doctor if you have any problems. Where can you learn more? Go to http://geraldine-mac.info/. Enter P234 in the search box to learn more about \"Nutrition for Breastfeeding Mothers: Care Instructions. \"  Current as of: March 16, 2017  Content Version: 11.4  © 3916-1454 [employer], [employer].  Care instructions adapted under license by [employer] (which disclaims liability or warranty for this information). If you have questions about a medical condition or this instruction, always ask your healthcare professional. [address] any warranty or liability for your use of this information. Depression After Childbirth: Care Instructions  Your Care Instructions    Many women get the \"baby blues\" during the first few days after childbirth. You may lose sleep, feel irritable, and cry easily. You may feel happy one minute and sad the next. Hormone changes are one cause of these emotional changes. Also, the demands of a new baby, along with visits from relatives or other family needs, add to a mother's stress. The \"baby blues\" often peak around the fourth day. Then they ease up in less than 2 weeks. If your moodiness or anxiety lasts for more than 2 weeks, or if you feel like life is not worth living, you may have postpartum depression. This is different for each mother. Some mothers with serious depression may worry intensely about their infant's well-being. Others may feel distant from their child. Some mothers might even feel that they might harm their baby. A mother may have signs of paranoia, wondering if someone is watching her. Depression is not a sign of weakness. It is a medical condition that requires treatment. Medicine and counseling often work well to reduce depression. Talk to your doctor about taking antidepressant medicine while breastfeeding. Follow-up care is a key part of your treatment and safety. Be sure to make and go to all appointments, and call your doctor if you are having problems. It's also a good idea to know your test results and keep a list of the medicines you take. How do you know if you are depressed? With all the changes in your life, you may not know if you are depressed.  Pregnancy sometimes causes changes in how you feel that are similar to the symptoms of depression. Symptoms of depression include:  · Feeling sad or hopeless and losing interest in daily activities. These are the most common symptoms of depression. · Sleeping too much or not enough. · Feeling tired. You may feel as if you have no energy. · Eating too much or too little. · Writing or talking about death, such as writing suicide notes or talking about guns, knives, or pills. Keep the numbers for these national suicide hotlines: -TALK (4-760.773.2660) and 4-703-QGTLTFF (2-219.964.9043). If you or someone you know talks about suicide or feeling hopeless, get help right away. How can you care for yourself at home? · Be safe with medicines. Take your medicines exactly as prescribed. Call your doctor if you think you are having a problem with your medicine. · Eat a healthy diet so that you can keep up your energy. · Get regular daily exercise, such as walks, to help improve your mood. · Get as much sunlight as possible. Keep your shades and curtains open. Get outside as much as you can. · Avoid using alcohol or other substances to feel better. · Get as much rest and sleep as possible. Avoid doing too much. Being too tired can increase depression. · Play stimulating music throughout your day and soothing music at night. · Schedule outings and visits with friends and family. Ask them to call you regularly, so that you do not feel alone. · Ask for help with preparing food and other daily tasks. Family and friends are often happy to help a mother with a . · Be honest with yourself and those who care about you. Tell them about your struggle. · Join a support group of new mothers. No one can better understand the challenges of caring for a  than other new mothers. · If you feel like life is not worth living or are feeling hopeless, get help right away.  Keep the numbers for these national suicide hotlines: -TALK (5-945.116.2677) and 3-776-PFSPXEV (3-126.431.1475). When should you call for help? Call 911 anytime you think you may need emergency care. For example, call if:  ? · You feel you cannot stop from hurting yourself, your baby, or someone else. ?Call your doctor now or seek immediate medical care if:  ? · You are having trouble caring for yourself or your baby. ? · You hear voices. ? Watch closely for changes in your health, and be sure to contact your doctor if:  ? · You have problems with your depression medicine. ? · You do not get better as expected. Where can you learn more? Go to http://geraldine-mac.info/. Enter V758 in the search box to learn more about \"Depression After Childbirth: Care Instructions. \"  Current as of: May 12, 2017  Content Version: 11.4    © 3112-4362 [employer], [employer]. Care instructions adapted under license by [employer] (which disclaims liability or warranty for this information). If you have questions about a medical condition or this instruction, always ask your healthcare professional. [address] any warranty or liability for your use of this information.

## 2018-03-21 NOTE — IP AVS SNAPSHOT
[address] 
[phone] Patient: Yadi Mclean 
MRN: [id number] ANW:[id number] A check mary indicates which time of day the medication should be taken. My Medications START taking these medications Instructions Each Dose to Equal  
 Morning Noon Evening Bedtime  
 oxyCODONE-acetaminophen 5-325 mg per tablet Commonly known as:  PERCOCET Your last dose was: Your next dose is: Take 1 Tab by mouth every four (4) hours as needed. Max Daily Amount: 6 Tabs. 1 Tab CONTINUE taking these medications Instructions Each Dose to Equal  
 Morning Noon Evening Bedtime PRENATAL DHA+COMPLETE PRENATAL -300 mg-mcg-mg Cmpk Generic drug:  [id number] lv-folic-dha Your last dose was: Your next dose is: Take  by mouth. ZOFRAN (AS HYDROCHLORIDE) 8 mg tablet Generic drug:  ondansetron hcl Your last dose was: Your next dose is: Take 8 mg by mouth every eight (8) hours as needed for Nausea. 8 mg Where to Get Your Medications Information on where to get these meds will be given to you by the nurse or doctor. ! Ask your nurse or doctor about these medications  
  oxyCODONE-acetaminophen 5-325 mg per tablet

## 2018-03-21 NOTE — IP AVS SNAPSHOT
[address] 
[phone] Patient: Olan Severe 
MRN: [id number] About your hospitalization You were admitted on:  March 21, 2018 You [facility] in the:  [facility] [address] You were discharged on:  March 23, 2018 Why you were hospitalized Your primary diagnosis was:  Not on File Your diagnoses also included:  Pregnancy Follow-up Information Follow up With Details Comments Contact Info Tata Ferguson MD In 6 weeks for postpartum check  [address] [facility] 150 [address] [facility] 
[phone] Discharge Orders None A check mary indicates which time of day the medication should be taken. My Medications START taking these medications Instructions Each Dose to Equal  
 Morning Noon Evening Bedtime  
 oxyCODONE-acetaminophen 5-325 mg per tablet Commonly known as:  PERCOCET Your last dose was: Your next dose is: Take 1 Tab by mouth every four (4) hours as needed. Max Daily Amount: 6 Tabs. 1 Tab CONTINUE taking these medications Instructions Each Dose to Equal  
 Morning Noon Evening Bedtime PRENATAL DHA+COMPLETE PRENATAL -300 mg-mcg-mg Cmpk Generic drug:  [id number] lv-folic-dha Your last dose was: Your next dose is: Take  by mouth. ZOFRAN (AS HYDROCHLORIDE) 8 mg tablet Generic drug:  ondansetron hcl Your last dose was: Your next dose is: Take 8 mg by mouth every eight (8) hours as needed for Nausea. 8 mg Where to Get Your Medications Information on where to get these meds will be given to you by the nurse or doctor. ! Ask your nurse or doctor about these medications  
  oxyCODONE-acetaminophen 5-325 mg per tablet Discharge Instructions Discharge Instructions for Vaginal Delivery Patient ID: 
Aaliyah Tavarez 
[id number] 
33 y.o. 
1990 Take Home Medications Continue taking your prenatal vitamins if you are breastfeeding. Follow-up care is a key part of your treatment and safety. Please schedule and keep appointments. Follow-up with your primary OB in 6 weeks. Activity Avoid anything in your vagina for 6 weeks (no intercourse, tampons, or douching). You may drive unless you are taking prescription pain medications. Climbing stairs and light lifting are okay. Please avoid excessive exercise, though walking is okay- you'll be tired! Diet Regular diet as tolerated. Be sure to drink plenty of fluids if you are breastfeeding. Wound care If you have stitches, continue to rinse with a squirt bottle of warm water each time you void for about 7-10 days. .  Your stitches will gradually dissolve over four to eight weeks. Sitz baths are also helpful to keep the wound clean, encourage healing, and to help with pain associated with the stitches or hemorrhoids. You can use either a sitz bath basin or a bathtub filled with 2-3\" inches of plain warm water. Soak for 10 minutes 3 times a day as tolerated. Pain Management 1. Over the counter medications such as Tylenol and ibuprofen (Motrin or Advil) are ideal.  These may be taken together, alternating doses. You may  take the maximum dose:  Motrin or Advil (generic ibuprofen), either 3 tablets every 6 hours or 4 tablets every 8 hours or Tylenol (acetominophen) 1000mg every 6 hours (equivalent to 2 extra strength Tylenol). 2. You may also have a precrescription for stronger pain medication. Take only as needed and transition to over the counter medication in the next few days. Minimize amounts of the prescription medication, as it can be habit-forming and will worsen or cause constipation.  Most patients will find that within a couple of days, their pain is adequately controlled using only over-the-counter medications. 3. The prescription pain medication is mixed with Tylenol, therefore, you should not take any extra Tylenol or acetaminophen until you have reduced your prescription pain medication. 4. Add heating pad or sitz baths as needed. Add hemorrhoid wipes or ointments if needed Constipation 1. Constipation is normal after pregnancy and delivery, especially while taking prescription narcotic pain medication. 2. Over the counter remedies including ducosate (Colace), take 1-2 capsules 1-2 times daily for soft stool as needed. You may also add/ try milk of magnesia or rectal remedies such as Dulcolax or Fleets enema. Recovery: What to Expect at [facility] 1. Fatigue is expected. Try to rest when you can and don't worry about doing housework or other tasks which can wait. 2. The soreness along your bottom will improve significantly over the first 2 weeks, but it may take 6 weeks before you are completely recovered. 3. Back pain or general body aches or muscle soreness are expected and should improve with acetominophen or ibuprofen. 4. Leg swelling due to pregnancy and/or IV fluids given in the hospital will take about two weeks to resolve. 5. Most women experience some form of the \"Baby Blues\" after having a baby. Feeling emotional, tearful, frustrated, anxious, sad, and irritable some of the time is normal and go away after about 2 weeks. Adequate rest and help from your family will help. Take breaks from caring for the baby. Call your doctor if your symptoms seem severe, last more than 2 weeks, or seem to be getting worse instead of better. Get help immediately if you have thoughts of wanting to hurt yourself or others! Call your doctor or seek immediate medical care if you have: 
Heavy vaginal bleeding, soaking through one or more pads an hour for several hours. Foul-smelling discharge from your vagina or incision.  
Consistent nausea and vomiting and cannot keep fluids down. Consistent pain that does not get better after you take pain medicine. Sudden chest pain and shortness of breath Signs of a blood clot: pain/ swelling/ increasing redness in your lower extremeties Signs of infection: increased pain in your abdomen or vaginal area; red streaks, warmth, or tenderness of your breasts; fever of 100.5 F or greater Breast Engorgement: Care Instructions Your Care Instructions Breast engorgement is the painful overfilling of the breasts that can occur during breastfeeding. It usually occurs when your breasts make more milk than your baby can drink or when you are unable to breastfeed or pump. It also happens when you stop breastfeeding your baby. Breast engorgement can make it hard for your baby to latch on to your nipple. Your baby may then be unable to breastfeed. This makes the problem worse. If you are breastfeeding, engorgement should get better in 12 to 24 hours. And it should disappear within a few days. If you are not breastfeeding, you will get better in 1 to 5 days or when your body stops making breast milk. Follow-up care is a key part of your treatment and safety. Be sure to make and go to all appointments, and call your doctor if you are having problems. It's also a good idea to know your test results and keep a list of the medicines you take. How can you care for yourself at home? · If your doctor gave you medicine, take it exactly as prescribed. Call your doctor if you think you are having a problem with your medicine. · Take an over-the-counter pain medicine, such as acetaminophen (Tylenol), ibuprofen (Advil, Motrin), or naproxen (Aleve). Be safe with medicines. Read and follow all instructions on the label. · Do not take two or more pain medicines at the same time unless the doctor told you to. Many pain medicines have acetaminophen, which is Tylenol. Too much acetaminophen (Tylenol) can be harmful.  
· If your baby is having a hard time latching on, let out (express) a small amount of milk with your hands or a pump. This will help soften your nipple and make it easier for your baby to latch on. 
· If your breasts are uncomfortably full, pump or express breast milk by hand just until they are comfortable. Do not empty your breasts all the way. Releasing a lot of milk will cause your body to produce larger amounts of milk. This can make breast engorgement worse. · Gently massage your breasts to help milk flow during breastfeeding or pumping. · Apply a frozen wet towel, cold gel or ice packs, or bags of frozen vegetables to your breasts for 15 minutes at a time every hour as needed. (Put a thin cloth between the ice pack and your skin.) · Avoid tight bras that press on your breasts. A tight bra can cause blocked milk ducts. To prevent breast engorgement · Put a warm, wet washcloth on your breasts before breastfeeding. This may help your breasts \"let down,\" increasing the flow of milk. Or you can take a warm shower or use a heating pad set on low. (Never use a heating pad in bed, because you may fall asleep and burn yourself.) · Change your baby's position occasionally to make sure that all parts of your breasts are emptied. · Make sure your baby is latched on properly. · Talk to your doctor or a lactation consultant about any problems you have with breastfeeding. When should you call for help? Call your doctor now or seek immediate medical care if: 
? · You have symptoms of a breast infection, such as: 
¨ Increased pain, swelling, redness, or warmth around a breast. 
¨ Red streaks extending from the breast. 
¨ Pus draining from a breast. 
¨ A fever. ? Watch closely for changes in your health, and be sure to contact your doctor if: 
? · You do not get better as expected. Where can you learn more? Go to http://geraldine-mac.info/.  
Enter B349 in the search box to learn more about \"Breast Engorgement: Care Instructions. \" Current as of: March 16, 2017 Content Version: 11.4 © 8696-8454 [employer]. Care instructions adapted under license by [employer] (which disclaims liability or warranty for this information). If you have questions about a medical condition or this instruction, always ask your healthcare professional. [address] any warranty or liability for your use of this information. Breastfeeding: Care Instructions Your Care Instructions Breastfeeding has many benefits. It may lower your baby's chances of getting an infection. It also may prevent your baby from having problems such as diabetes and high cholesterol later in life. Breastfeeding also helps you bond with your baby. The American Academy of Pediatrics recommends breastfeeding for at least a year. That may be very hard for many women to do, but breastfeeding even for a shorter period of time is a health benefit to you and your baby. In the first days after birth, your breasts make a thick, yellow liquid called colostrum. This liquid gives your baby nutrients and antibodies against infection. It is all that babies need in the first days after birth. Your breasts will fill with milk a few days after the birth. Breastfeeding is a skill that gets better with practice. It is common to have some problems. Some women have sore or cracked nipples, blocked milk ducts, or a breast infection (mastitis). But if you feed your baby every 1 to 2 hours during the day and follow the tips on this sheet, you may not have these problems. You can treat these problems if they happen and continue breastfeeding. Follow-up care is a key part of your treatment and safety. Be sure to make and go to all appointments, and call your doctor if you are having problems. It's also a good idea to know your test results and keep a list of the medicines you take. How can you care for yourself at home?  
· Breastfeed your baby whenever he or she is hungry. In the first 2 weeks, your baby will feed about every 1 to 3 hours. This will help you keep up your supply of milk. · Put a bed pillow or a nursing pillow on your lap to support your arms and your baby. · Hold your baby in a comfortable position. ¨ You can hold your baby in several ways. One of the most common positions is the cradle hold. One arm supports your baby, with his or her head in the bend of your elbow. Your open hand supports your baby's bottom or back. Your baby's belly lies against yours. ¨ If you had your baby by , or , try the football hold. This position keeps your baby off your belly. Tuck your baby under your arm, with his or her body along the side you will be feeding on. Support your baby's upper body with your arm. With that hand you can control your baby's head to bring his or her mouth to your breast. 
¨ Try different positions with each feeding. If you are having problems, ask for help from your doctor or a lactation consultant. · To get your baby to latch on: 
¨ Support and narrow your breast with one hand using a \"U hold,\" with your thumb on the outer side of your breast and your fingers on the inner side. You can also use a \"C hold,\" with all your fingers below the nipple and your thumb above it. Try the different holds to get the deepest latch for whichever breastfeeding position you use. Your other arm is behind your baby's back, with your hand supporting the base of the baby's head. Position your fingers and thumb to point toward your baby's ears. ¨ You can touch your baby's lower lip with your nipple to get your baby to open his or her mouth. Wait until your baby opens up really wide, like a big yawn. Then be sure to bring the baby quickly to your breast-not your breast to the baby. As you bring your baby toward your breast, use your other hand to support the breast and guide it into his or her mouth.  
¨ Both the nipple and a large portion of the darker area around the nipple (areola) should be in the baby's mouth. The baby's lips should be flared outward, not folded in (inverted). ¨ Listen for a regular sucking and swallowing pattern while the baby is feeding. If you cannot see or hear a swallowing pattern, watch the baby's ears, which will wiggle slightly when the baby swallows. If the baby's nose appears to be blocked by your breast, tilt the baby's head back slightly, so just the edge of one nostril is clear for breathing. ¨ When your baby is latched, you can usually remove your hand from supporting your breast and bring it under your baby to cradle him or her. Now just relax and breastfeed your baby. · You will know that your baby is feeding well when: 
¨ His or her mouth covers a lot of the areola, and the lips are flared out. ¨ His or her chin and nose rest against your breast. 
¨ Sucking is deep and rhythmic, with short pauses. ¨ You are able to see and hear your baby swallowing. ¨ You do not feel pain in your nipple. · If your baby takes only one breast at a feeding, start the next feeding on the other breast. 
· Anytime you need to remove your baby from the breast, put one finger in the corner of his or her mouth. Push your finger between your baby's gums to gently break the seal. If you do not break the tight seal before you remove your baby, your nipples can become sore, cracked, or bruised. · After feeding your baby, gently pat his or her back to let out any swallowed air. After your baby burps, offer the breast again, or offer the other breast. Sometimes a baby will want to keep feeding after being burped. When should you call for help? Call your doctor now or seek immediate medical care if: 
? · You have symptoms of a breast infection, such as: 
¨ Increased pain, swelling, redness, or warmth around a breast. 
¨ Red streaks extending from the breast. 
¨ Pus draining from a breast. 
¨ A fever. ? · Your baby has no wet diapers for 6 hours. ? Watch closely for changes in your health, and be sure to contact your doctor if: 
? · Your baby has trouble latching on to your breast.  
? · You continue to have pain or discomfort when breastfeeding. ? · You have other questions or concerns. Where can you learn more? Go to http://geraldine-mac.info/. Enter P492 in the search box to learn more about \"Breastfeeding: Care Instructions. \" Current as of: March 16, 2017 Content Version: 11.4 © 0718-6288 [employer]. Care instructions adapted under license by [employer] (which disclaims liability or warranty for this information). If you have questions about a medical condition or this instruction, always ask your healthcare professional. [address] any warranty or liability for your use of this information. Nutrition for Breastfeeding Mothers: Care Instructions Your Care Instructions When a woman breastfeeds her baby, she needs more nutrients to keep herself healthy and to make the baby's milk. Breastfeeding helps build the bond between you and your baby. It gives your baby excellent health benefits. A healthy diet includes eating a variety of foods from the basic food groups: grains, vegetables, fruits, milk and milk products (such as cheese and yogurt), and meat and dried beans. Eating well during breastfeeding will ensure that you stay healthy and your baby grows and develops normally. Follow-up care is a key part of your treatment and safety. Be sure to make and go to all appointments, and call your doctor if you are having problems. It's also a good idea to know your test results and keep a list of the medicines you take. How can you care for yourself at home? · Include 3 to 4 cups of nonfat or low-fat milk or milk products in your diet every day. These include: ¨ Milk (8 ounces equals 1 cup).  
¨ Ice cream (1½ cups equals 1 cup of milk). 
¨ Cheese (1½ ounces of cheese equals 1 cup). ¨ Yogurt (8 ounces equals 1 cup). · Eat at least 7 ounces of grains, such as cereals, breads, crackers, rice, or pasta, every day. One ounce is about 1 slice of bread, 1 cup of breakfast cereal, or ½ cup of cooked rice, cereal, or pasta. · Eat 3 cups of vegetables each day. Choices include: ¨ Dark-green vegetables such as broccoli and spinach. ¨ Orange vegetables such as carrots and sweet potatoes. ¨ Dried beans (such as aranda and kidney beans) and peas (such as lentils). · Every day, eat 2 cups of fresh, frozen, or canned fruit. · Eat 6½ ounces each day of protein, such as chicken, fish, lean meat, eggs, peanut butter, dried beans and peas, nuts, and seeds. One egg, 1 tablespoon of peanut butter, or ½ ounce of nuts or seeds equals 1 ounce of protein. A ½ cup of cooked beans equals 2 ounces of protein. · Drink plenty of fluids, enough so that your urine is light yellow or clear like water. If you have kidney, heart, or liver disease and have to limit fluids, talk with your doctor before you increase the amount of fluids you drink. · Limit caffeine products, such as coffee, tea, chocolate, and some sodas. Caffeine can pass to your baby through breast milk. It may cause fussiness and sleep problems in babies. · Your doctor may recommend a vitamin supplement. Take it as recommended. · Consider joining a breastfeeding support group. These are offered at many hospitals and birthing centers by nurses, nurse-midwives, or lactation consultants. When should you call for help? Watch closely for changes in your health, and be sure to contact your doctor if you have any problems. Where can you learn more? Go to http://geraldine-mac.info/. Enter P234 in the search box to learn more about \"Nutrition for Breastfeeding Mothers: Care Instructions. \" Current as of: March 16, 2017 Content Version: 11.4 © 1644-4797 Healthwise, [facility].  Care instructions adapted under license by [employer] (which disclaims liability or warranty for this information). If you have questions about a medical condition or this instruction, always ask your healthcare professional. [address] any warranty or liability for your use of this information. Depression After Childbirth: Care Instructions Your Care Instructions Many women get the \"baby blues\" during the first few days after childbirth. You may lose sleep, feel irritable, and cry easily. You may feel happy one minute and sad the next. Hormone changes are one cause of these emotional changes. Also, the demands of a new baby, along with visits from relatives or other family needs, add to a mother's stress. The \"baby blues\" often peak around the fourth day. Then they ease up in less than 2 weeks. If your moodiness or anxiety lasts for more than 2 weeks, or if you feel like life is not worth living, you may have postpartum depression. This is different for each mother. Some mothers with serious depression may worry intensely about their infant's well-being. Others may feel distant from their child. Some mothers might even feel that they might harm their baby. A mother may have signs of paranoia, wondering if someone is watching her. Depression is not a sign of weakness. It is a medical condition that requires treatment. Medicine and counseling often work well to reduce depression. Talk to your doctor about taking antidepressant medicine while breastfeeding. Follow-up care is a key part of your treatment and safety. Be sure to make and go to all appointments, and call your doctor if you are having problems. It's also a good idea to know your test results and keep a list of the medicines you take. How do you know if you are depressed? With all the changes in your life, you may not know if you are depressed.  Pregnancy sometimes causes changes in how you feel that are similar to the symptoms of depression. Symptoms of depression include: · Feeling sad or hopeless and losing interest in daily activities. These are the most common symptoms of depression. · Sleeping too much or not enough. · Feeling tired. You may feel as if you have no energy. · Eating too much or too little. · Writing or talking about death, such as writing suicide notes or talking about guns, knives, or pills. Keep the numbers for these national suicide hotlines: 6-585-959-TALK (2-372.321.7435) and 2-579-RRRVXYK (4-169.651.4071). If you or someone you know talks about suicide or feeling hopeless, get help right away. How can you care for yourself at home? · Be safe with medicines. Take your medicines exactly as prescribed. Call your doctor if you think you are having a problem with your medicine. · Eat a healthy diet so that you can keep up your energy. · Get regular daily exercise, such as walks, to help improve your mood. · Get as much sunlight as possible. Keep your shades and curtains open. Get outside as much as you can. · Avoid using alcohol or other substances to feel better. · Get as much rest and sleep as possible. Avoid doing too much. Being too tired can increase depression. · Play stimulating music throughout your day and soothing music at night. · Schedule outings and visits with friends and family. Ask them to call you regularly, so that you do not feel alone. · Ask for help with preparing food and other daily tasks. Family and friends are often happy to help a mother with a . · Be honest with yourself and those who care about you. Tell them about your struggle. · Join a support group of new mothers. No one can better understand the challenges of caring for a  than other new mothers. · If you feel like life is not worth living or are feeling hopeless, get help right away.  Keep the numbers for these national suicide hotlines: 0-266-710-TALK (7-461.897.9281) and 2-326-UTHEQOL (2-371.713.1578). When should you call for help? Call 911 anytime you think you may need emergency care. For example, call if: 
? · You feel you cannot stop from hurting yourself, your baby, or someone else. ?Call your doctor now or seek immediate medical care if: 
? · You are having trouble caring for yourself or your baby. ? · You hear voices. ? Watch closely for changes in your health, and be sure to contact your doctor if: 
? · You have problems with your depression medicine. ? · You do not get better as expected. Where can you learn more? Go to http://geraldine-mac.info/. Enter D422 in the search box to learn more about \"Depression After Childbirth: Care Instructions. \" Current as of: May 12, 2017 Content Version: 11.4 © 1496-8828 [employer]. Care instructions adapted under license by [employer] (which disclaims liability or warranty for this information). If you have questions about a medical condition or this instruction, always ask your healthcare professional. [address] any warranty or liability for your use of this information. TEAM INTERVAL Announcement We are excited to announce that we are making your provider's discharge notes available to you in TEAM INTERVAL. You will see these notes when they are completed and signed by the physician that discharged you from your recent hospital stay. If you have any questions or concerns about any information you see in TEAM INTERVAL, please call the Health Information Department where you were seen or reach out to your Primary Care Provider for more information about your plan of care. Introducing [facility]! [facility] introduces TEAM INTERVAL patient portal. Now you can access parts of your medical record, email your doctor's office, and request medication refills online. 1. In your internet browser, go to [email or web address] 2.  Click on the First Time User? Click Here link in the Sign In box. You will see the New Member Sign Up page. 3. Enter your VtagO Access Code exactly as it appears below. You will not need to use this code after youve completed the sign-up process. If you do not sign up before the expiration date, you must request a new code. · VtagO Access Code: [email or web address] Expires: 5/21/2018 11:56 AM 
 
4. Enter the last four digits of your Social Security Number (xxxx) and Date of Birth (mm/dd/yyyy) as indicated and click Submit. You will be taken to the next sign-up page. 5. Create a VtagO ID. This will be your VtagO login ID and cannot be changed, so think of one that is secure and easy to remember. 6. Create a VtagO password. You can change your password at any time. 7. Enter your Password Reset Question and Answer. This can be used at a later time if you forget your password. 8. Enter your e-mail address. You will receive e-mail notification when new information is available in [address] 9. Click Sign Up. You can now view and download portions of your medical record. 10. Click the Download Summary menu link to download a portable copy of your medical information. If you have questions, please visit the Frequently Asked Questions section of the VtagO website. Remember, VtagO is NOT to be used for urgent needs. For medical emergencies, dial 911. Now available from your iPhone and Android! Providers Seen During Your Hospitalization Provider Specialty Primary office phone Lety Caro MD Obstetrics & Gynecology [phone] Your Primary Care Physician (PCP) Primary Care Physician Office Phone Office Fax Kandy Trujillo [phone] You are allergic to the following No active allergies Recent Documentation Height Weight Breastfeeding? BMI OB Status Smoking Status 1.524 m 59 kg Unknown 25.39 kg/m2 Recent pregnancy Never Smoker Emergency Contacts Name Discharge Info Relation Home Work Mobile Jose Beltran DISCHARGE CAREGIVER [3] Spouse [3] [phone] Patient Belongings The following personal items are in your possession at time of discharge: 
  Dental Appliances: None  Visual Aid: None      Home Medications: None   Jewelry: Earrings, Ring, With patient  Clothing: Footwear, Shirt, Undergarments, Pants, With patient    Other Valuables: At bedside, Cell Phone  Personal Items Sent to Safe: none Please provide this summary of care documentation to your next provider. Signatures-by signing, you are acknowledging that this After Visit Summary has been reviewed with you and you have received a copy. Patient Signature:  ____________________________________________________________ Date:  ____________________________________________________________  
  
Marlyse Leaks Provider Signature:  ____________________________________________________________ Date:  ____________________________________________________________

## 2018-03-21 NOTE — L&D DELIVERY NOTE
Delivery Summary    Patient: Rebeca Combs MRN: [id number]  SSN: [id number]    YOB: 1990  Age: 33 y.o. Sex: female        Labor Events:    Labor: No    Rupture Date: 3/21/2018    Rupture Time: 8:50 AM    Rupture Type AROM    Amniotic Fluid Volume:      Amniotic Fluid Description: Clear  None    Induction: AROM; Oxytocin        Augmentation: None    Labor Complications: None     Additional Complications:        Cervical Ripening:       None      Delivery Events:  Episiotomy: None    Laceration(s): Second degree perineal      Repaired: Yes     Number of Repair Packets: 1    Suture Type and Size:         Estimated Blood Loss (ml): 350        Information for the patient's newbornEarsiobhan De La Cruz Male [[id number]]     Delivery Summary - Baby    Delivery Date: 3/21/2018   Delivery Time: 12:22 PM   Delivery Type: Vaginal, Spontaneous Delivery  Sex:  male  Gestational Age: 43w3d  Delivery Clinician:  Asim Munroe  Living?: Living   Delivery Location: L&D             APGARS  One minute Five minutes Ten minutes   Skin Color: 1    1       Heart Rate: 2   2         Reflex Irritability: 2   2         Muscle Tone: 2   2       Respiration: 2   2         Total: 9   9           Presentation: Vertex  Position: Left Occiput Anterior  Resuscitation Method:  Tactile Stimulation;Suctioning-bulb     Meconium Stained: None    Cord Information: 3 Vessels   Complications: Other(Comment) (Comment)  Cord Blood Sent?:  Yes    Blood Gases Sent?:  No    Placenta:  Date/Time:    Removal: Spontaneous      Appearance: Normal      Measurements:  Birth Weight:      Birth Length:     Head Circumference:       Chest Circumference:      Abdominal Girth:       Other Providers:   DEE MCINTOSH;ALICIA GARLAND;MAGGIE GODDARD;RAISA GUERRA;;;;;; Obstetrician;Primary Nurse;Primary [place] Nurse;Charge Nurse;Neonatologist;Anesthesiologist;Crna;Nurse Practitioner;Midwife;Nursery Nurse           Delivery Note:     Patient reached  and pushed with good effort to deliver the fetal head in GLENROY position. No nuchal cord found. The anterior shoulder, followed by the posterior shoulder and the rest of the body then delivered easily. This was a VMI with Apgars of 9 and 9 at 1 and 5 minutes respectively, weight pending. The infant was placed on mom's abdomen. The cord was then double clamped and cut by the FOB. Cord blood was taken. The placenta followed spontaneously, intact, with 3VC with a true knot. Pitocin was added to the IVF and the fundus was firm to palpation. The vagina, cervix and perineum were examined and a 2nd degree perineal laceration was found and repaired with 2-0 chromic.  mL. No complications. Mom and baby doing well. Dr. Criss Hutson delivering.      Keenan Denney MD  [facility]

## 2018-03-21 NOTE — PROGRESS NOTES
6460: Bedside and Verbal shift change report given to Savanna Barney RN (oncoming nurse) by Tayla Pablo RN (offgoing nurse). Report included the following information SBAR, Kardex, Intake/Output, MAR and Recent Results.

## 2018-03-21 NOTE — PROGRESS NOTES
03/21/18 3:55 PM  CM met with DIANNA's /FOB Zackary ([phone]) to complete initial assessment and to begin discharge planning. Demographics were reviewed and confirmed. DIANNA does not work outside the home, GILLIAN works and will have 2 weeks off from work. The couple has two older children, ages 3 and 1. DIANNA is breastfeeding and has a pump to use at home. [facility]. Patient has car seat, crib, clothing, and other necessary supplies. DIANNA has [address] [facility] and Medicaid services and GILLIAN was educated on how to add the baby to both. Care Management Interventions  PCP Verified by CM:  Yes  Mode of Transport at Discharge: Self  Transition of Care Consult (CM Consult): Discharge Planning  Current Support Network: Lives with Spouse  Confirm Follow Up Transport: Family  Plan discussed with Pt/Family/Caregiver: Yes  Discharge Location  Discharge Placement: Home with family assistance  STEPHANIE Garcia

## 2018-03-21 NOTE — DISCHARGE SUMMARY
Obstetrical Discharge Summary     Name: Kierra Doshi MRN: [id number]  SSN: [id number]    YOB: 1990  Age: 33 y.o. Sex: female      Admit Date: 3/21/2018    Discharge Date: 3/23/18     Attending Physician:  Belen Odonnell MD     Delivering Physician:  Belen Odonnell MD     * Admission Diagnoses:   IUP @ 39w4d   GDM      * Discharge Diagnoses:   Delivery of a VMI via  by Chandrakant Elena MD on 3/21/2018. Apgars were 9 and 9.      2nd degree perineal laceration       Additional Diagnoses:   Hospital Problems as of 3/21/2018  Date Reviewed: 3/7/2018          Codes Class Noted - Resolved POA    Pregnancy ICD-10-CM: Z34.90  ICD-9-CM: V22.2  3/21/2018 - Present Unknown             Lab Results   Component Value Date/Time    Rubella, External immune 10/03/2017    GrBStrep, External NEGATIVE 2018      There is no immunization history on file for this patient. * Procedures:   IOL             * Discharge Condition: good    * Hospital Course: Normal hospital course following the delivery. * Disposition: Home    Discharge Medications:   Current Discharge Medication List          * Follow-up Care/Patient Instructions:   Activity: Activity as tolerated  Diet: Regular Diet  Wound Care: As directed      Followup 6 weeks for PP check        Signed By:  eBlen Odonnell MD     2018

## 2018-03-21 NOTE — PROGRESS NOTES
Bedside and Verbal shift change report given to [address] (oncoming nurse) by Lisa Ling RNC (offgoing nurse). Report included the following information SBAR, Kardex, Intake/Output, MAR and Recent Results.

## 2018-03-21 NOTE — PROGRESS NOTES
Nutrition:    [facility] referral received for Gestational DM. Chart reviewed. 33 yof admitted w/ pregnancy @ 39w4d for IOL. Currently on CLD. BG . No previous hx of DM noted. Nutrition assessment not indicated at this time, will be available by consult if needed. Thank you.     Tc Morris RD, [address] [place] [address]  Clinical Dietitian  [phone]

## 2018-03-21 NOTE — PROGRESS NOTES
1731 Pt is sitting in the bed and she is resting  1812 Pt is sitting in the bed and she is eating lunch

## 2018-03-21 NOTE — ANESTHESIA PROCEDURE NOTES
Epidural Block    Start time: 3/21/2018 10:28 AM  End time: 3/21/2018 10:42 AM  Performed by: Rozina Yi by: Rony Vail     Pre-Procedure  Indication: labor epidural    Preanesthetic Checklist: patient identified, risks and benefits discussed, anesthesia consent, timeout performed and anesthesia consent    Timeout Time: 10:28        Epidural:   Patient position:  Seated  Prep region:  Lumbar  Prep: Betadine    Location:  L3-4    Needle and Epidural Catheter:   Needle Type:  Tuohy  Needle Gauge:  17 G  Injection Technique:  Loss of resistance using air  Attempts:  1  Catheter Size:  18 G  Catheter at Skin Depth (cm):  9  Depth in Epidural Space (cm):  5  Events: no paresthesia and negative aspiration test    Test Dose:  Lidocaine 1.5% w/ epi and negative    Assessment:   Catheter Secured:  Tegaderm and tape  Insertion:  Uncomplicated  Patient tolerance:  Patient tolerated the procedure well with no immediate complications

## 2018-03-21 NOTE — LACTATION NOTE
This note was copied from a baby's chart. Discussed with mother her plan for feeding. Reviewed the benefits of exclusive breast milk feeding during the hospital stay. Informed her of the risks of using formula to supplement in the first few days of life as well as the benefits of successful breast milk feeding; referred her to the Breastfeeding booklet about this information. She acknowledges understanding of information reviewed and states that it is her plan to BF and possibly blend feed with formula for her infant. Will support her choice and offer additional information as needed. Pt will successfully establish breastfeeding by feeding in response to early feeding cues   or wake every 3h, will obtain deep latch, and will keep log of feedings/output. Taught to BF at hunger cues and or q 2-3 hrs and to offer 10-20 drops of hand expressed colostrum at any non-feeds.       Breast Assessment  Left Breast: Small , Medium  Left Nipple: Everted, Intact  Right Breast: Small , Medium  Right Nipple: Everted, Intact  Breast- Feeding Assessment  Attends Breast-Feeding Classes: No (Family's feeding goals discussed, mom verbalizes desire to BF, BF basics, normal  BF behaviors, how milk is made for 3rd child, importance of S2S bonding at breast, positioning that supports infant's innate breast crawl BF behaviors encouraged)  Breast-Feeding Experience: Yes (BF 1st child x 4+ mo, formula fed 2nd child, mom states \"I want to BF this baby\")  Breast Trauma/Surgery: No  Type/Quality: Good (LC visit during [address], Infant feeding w/vigor + ease at left breast; sustained rhythmic suckling pattern noted)  Lactation Consultant Visits  Breast-Feedings: Good   Mother/Infant Observation  Mother Observation: Alignment, Breast comfortable, Recognizes feeding cues, Lets baby end feeding (Mom speaks English hoowever Swiss is her 1st language, English + Swiss BF booklets provided)  Infant Observation: Feeding cues, Rhythmic suck, Relaxed after feeding, Lips flanged, upper, Lips flanged, lower, Latches nipple and aereolae, Opens mouth  LATCH Documentation  Latch: Grasps breast, tongue down, lips flanged, rhythmic sucking  Audible Swallowing: A few with stimulation  Type of Nipple: Everted (after stimulation)  Comfort (Breast/Nipple): Soft/non-tender  Hold (Positioning): Full assist, teach one side, mother does other, staff holds  LATCH Score: 8   Mom states \"he is sucking, but I do not having milk right now, isn't that correct? \"  LC explains colostrum production and purpose in first days of life, encouraged to bond at breast, freely nurse her infant and that formula may be her family's decision however it is not necessary for a healthy, term . Biological Nurturing breastfeeding principles taught. How Biological Nurturing (BN)  promotes optimal breastfeeding (BF) sessions discussed. Mother encouraged to seek comfortable semi-reclining breastfeeding positions. Infant placed frontally along maternal contour. Primitive innate feeding reflexes/behaviors of the  discussed. BN tips and techniques shared; assisted with comfortable breastfeeding positioning. Hand Expression Education:  Mom taught how to manually hand express her colostrum. Emphasized the importance of providing infant with valuable colostrum as infant rests skin to skin at breast.  Aware to avoid extended periods of non-feeding. Aware to offer 10-20+ drops of colostrum every 2-3 hours until infant is latching and nursing effectively. Taught the rationale behind this low tech but highly effective evidence based practice.

## 2018-03-22 LAB
GLUCOSE BLD STRIP.AUTO-MCNC: 106 MG/DL (ref 65–100)
SERVICE CMNT-IMP: ABNORMAL

## 2018-03-22 PROCEDURE — 74011250637 HC RX REV CODE- 250/637: Performed by: OBSTETRICS & GYNECOLOGY

## 2018-03-22 PROCEDURE — 82962 GLUCOSE BLOOD TEST: CPT

## 2018-03-22 PROCEDURE — 65270000029 HC RM PRIVATE

## 2018-03-22 RX ADMIN — IBUPROFEN 800 MG: 800 TABLET ORAL at 16:18

## 2018-03-22 RX ADMIN — IBUPROFEN 800 MG: 800 TABLET ORAL at 07:40

## 2018-03-22 NOTE — ROUTINE PROCESS
Bedside and Verbal shift change report given to BEAN Aguila RN (oncoming nurse) by DOC Gonzales RN (offgoing nurse). Report included the following information SBAR, Kardex, Intake/Output, MAR and Recent Results.

## 2018-03-22 NOTE — LACTATION NOTE
Priscella Sandifer Lactation Consultant Signed  Progress Notes Date of Service: 03/22/18 1130         Problem: Lactation Care Plan  Goal: *Infant latching appropriately  Outcome: Progressing Towards Goal  Pt will successfully establish breastfeeding by feeding in response to infant's early feeding cues and/or to offer breast every 2-3 hours. Ways to obtain a deep latch and seek comfortable positioning shared, aware to keep log of feedings/output. Goal: *Weight loss less than 10% of birth weight  Outcome: Progressing Towards Goal  Pt chooses to do both breast and bottle. Discussed effects of early supplementation on breastfeeding success; may decrease breastmilk production and supply, increase risk for pathological engorgement, baby may develop preference for faster flow from bottles vs breast, and baby's stomach can be stretched if larger volumes of formula are given. Instructed mother to offer baby breast first so that baby gets moms immunity and antibodies     Problem: Patient Education: Go to Patient Education Activity  Goal: Patient/Family Education  Outcome: Progressing Towards Goal  Discussed what to do if nipples get sore. Care for sore/tender nipples discussed:  ways to improve positioning and latch practiced and discussed, hand express colostrum after feedings and let air dry, light application of lanolin, hydrogel pads, seek comfortable laid back feeding position, start feedings on least sore side first.     Comments: Pt will successfully establish breastfeeding by feeding in response to early feeding cues   or wake every 3h, will obtain deep latch, and will keep log of feedings/output.   Taught to BF at hunger cues and or q 2-3 hrs and to offer 10-20 drops of hand expressed colostrum at any non-feeds.       Breast Assessment  Left Breast: Small , Medium  Left Nipple: Everted, Intact  Right Breast: Small , Medium  Right Nipple: Everted, Intact  Breast- Feeding Assessment  Attends Breast-Feeding Classes: No  Breast-Feeding Experience: Yes  Breast Trauma/Surgery: No  Type/Quality: Good (Mother is breast/formula feeding. Instructed mother to offer baby breast 1st so that baby gets her antibodies)  Lactation Consultant Visits  Breast-Feedings: Good  (Baby was latched on and breastfeeding well during visit.  Encouraged mother to wait until baby opens his mouth wide prior to latching on)  Mother/Infant Observation  Mother Observation: Alignment, Breast comfortable, Close hold, Holds breast, Lets baby end feeding, Recognizes feeding cues  Infant Observation: Audible swallows, Frenulum checked, Latches nipple and aereolae, Lips flanged, lower, Lips flanged, upper, Opens mouth, Relaxed after feeding, Rhythmic suck  LATCH Documentation  Latch: Grasps breast, tongue down, lips flanged, rhythmic sucking  Audible Swallowing: A few with stimulation  Type of Nipple: Everted (after stimulation)  Comfort (Breast/Nipple): Soft/non-tender  Hold (Positioning): No assist from staff, mother able to position/hold infant  LATCH Score: 9

## 2018-03-22 NOTE — PROGRESS NOTES
Bedside and Verbal shift change report given to Blanca Bullock RN (oncoming nurse) by Dwain Warner RN (offgoing nurse). Report included the following information SBAR, ED Summary, Procedure Summary, Intake/Output, MAR and Recent Results.

## 2018-03-22 NOTE — PROGRESS NOTES
Post-Partum Day Number 1 Progress Note    Disha Ur       Information for the patient's :  Devin, Male [[id number]]   Vaginal, Spontaneous Delivery   Patient doing well without significant complaint. Voiding without difficulty, normal lochia. Tolerating diet without nausea or vomiting. Pain controlled with oral medications. Vitals:  Visit Vitals    /62 (BP 1 Location: Right arm, BP Patient Position: At rest)    Pulse 65    Temp 98.1 °F (36.7 °C)    Resp 14    Ht 5' (1.524 m)    Wt 59 kg (130 lb)    SpO2 99%    Breastfeeding Unknown    BMI 25.39 kg/m2     Temp (24hrs), Av.3 °F (36.8 °C), Min:98.1 °F (36.7 °C), Max:98.5 °F (36.9 °C)        Exam:   Patient without distress. FF @ U-2 NT                LE NT w/o edema    Labs:     Lab Results   Component Value Date/Time    WBC 7.6 2018 06:28 AM    HGB 12.8 2018 06:28 AM    HCT 37.5 2018 06:28 AM    PLATELET 999 5166 06:28 AM    Hgb, External 12.4 10/03/2017    Hct, External 36.6 10/03/2017     Lab Results   Component Value Date/Time    Rubella, External immune 10/03/2017    GrBStrep, External NEGATIVE 2018    HBsAg, External negative 10/03/2017    HIV, External negative 10/03/2017    RPR, External non reactive 10/03/2017    Gonorrhea, External negative 10/03/2017    Chlamydia, External negative 10/03/2017         Information for the patient's :  Devin, Male [[id number]]   One Minute Apgar: 5 (Filed from Delivery Summary)  Five  Minute Apgar: 5 (Filed from Delivery Summary)        Recent Results (from the past 24 hour(s))   GLUCOSE, POC    Collection Time: 18 10:47 AM   Result Value Ref Range    Glucose (POC) 78 65 - 100 mg/dL    Performed by Yessy           Assessment:   PPD 1 s/p , Doing well and stable  Plan:  1. Continue routine postpartum care  2. GDM -diet controlled, 2hr GTT with Endo PP   3.  Baby boy: desires circ, will perform later today     Ron Brewer DO  3/22/2018  7:45 AM

## 2018-03-23 VITALS
RESPIRATION RATE: 16 BRPM | OXYGEN SATURATION: 99 % | HEIGHT: 60 IN | DIASTOLIC BLOOD PRESSURE: 57 MMHG | WEIGHT: 130 LBS | BODY MASS INDEX: 25.52 KG/M2 | TEMPERATURE: 98.9 F | SYSTOLIC BLOOD PRESSURE: 108 MMHG | HEART RATE: 67 BPM

## 2018-03-23 PROCEDURE — 74011250637 HC RX REV CODE- 250/637: Performed by: OBSTETRICS & GYNECOLOGY

## 2018-03-23 RX ORDER — OXYCODONE AND ACETAMINOPHEN 5; 325 MG/1; MG/1
1 TABLET ORAL
Qty: 10 TAB | Refills: 0 | Status: SHIPPED | OUTPATIENT
Start: 2018-03-23 | End: 2022-05-04

## 2018-03-23 RX ADMIN — DOCUSATE SODIUM 100 MG: 100 CAPSULE, LIQUID FILLED ORAL at 09:45

## 2018-03-23 RX ADMIN — MUPIROCIN: 20 OINTMENT TOPICAL at 06:15

## 2018-03-23 RX ADMIN — IBUPROFEN 800 MG: 800 TABLET ORAL at 00:59

## 2018-03-23 RX ADMIN — IBUPROFEN 800 MG: 800 TABLET ORAL at 09:45

## 2018-03-23 NOTE — ROUTINE PROCESS
Bedside and Verbal shift change report given to KELLI Adames RN (oncoming nurse) by Jerod Jamison (offgoing nurse). Report included the following information SBAR, Procedure Summary, Intake/Output, MAR, Accordion, Recent Results and Med Rec Status.

## 2018-03-23 NOTE — PROGRESS NOTES
PostPartum Note    Willy Hinkle  [id number]  1990  33 y.o.    S:  Ms. Willy Hinkle is a 33 y.o.  PPD #2 s/p  @ 39w4d. Doing well. She had a baby boy. Her lochia is like a period. She describes her pain as mild and is well controlled with PO medications. She is breast feeding and this is going well. She is ambulating and voiding. Tolerating PO intake. O:   Visit Vitals    /57 (BP 1 Location: Right arm, BP Patient Position: At rest)    Pulse 67    Temp 98.9 °F (37.2 °C)    Resp 16    Ht 5' (1.524 m)    Wt 59 kg (130 lb)    SpO2 99%    Breastfeeding Unknown    BMI 25.39 kg/m2       Lab Results   Component Value Date/Time    WBC 7.6 2018 06:28 AM    HGB 12.8 2018 06:28 AM    HCT 37.5 2018 06:28 AM    PLATELET 121  06:28 AM    MCV 90.4 2018 06:28 AM    Hgb, External 12.4 10/03/2017    Hct, External 36.6 10/03/2017       Gen - No acute distress  Abdomen - Fundus firm, below the umbilicus   Ext - Warm, well perfused. Nontender    A/P:  PPD #2 s/p  @ 39w4d doing well. 1.  Routine PP instructions/ care discussed  2. Blood type - Rh +  3. Rubella imm  4. Circumcision done    5. Discharge today    6. F/U 4-6 weeks for PP check.       Matthew Noyola MD  [facility]

## 2018-03-23 NOTE — PROGRESS NOTES
Patient discharged home in stable condition. Discharge paperwork reviewed and signed. Prescriptions given. Instructed patient not to take Percocet and Tylenol together, understanding verbalized and no further medication questions. Patient instructed to follow up with primary OB in 6 weeks. Understanding verbalized. Gift pack given. Band verified with infant's bands. Infant placed in car seat by parents. Patient escorted out by volunteers.

## 2018-03-23 NOTE — LACTATION NOTE
This note was copied from a baby's chart. Pt chooses to do both breast and bottle. Discussed effects of early supplementation on breastfeeding success; may decrease breastmilk production and supply, increase risk for pathological engorgement, baby may develop preference for faster flow from bottles vs breast, and baby's stomach can be stretched if larger volumes of formula are given. Pt will successfully establish breastfeeding by feeding in response to early feeding cues   or wake every 3h, will obtain deep latch, and will keep log of feedings/output. Taught to BF at hunger cues and or q 2-3 hrs and to offer 10-20 drops of hand expressed colostrum at any non-feeds.       Breast Assessment  Left Breast: Small , Medium  Left Nipple: Everted, Painful  Right Breast: Small , Medium  Right Nipple: Everted, Friction damage, Painful  Breast- Feeding Assessment  Attends Breast-Feeding Classes: No  Breast-Feeding Experience: Yes (5-6 months with last child)  Breast Trauma/Surgery: No  Type/Quality: Good  Lactation Consultant Visits  Breast-Feedings: Attempted breast-feeding  Mother/Infant Observation  Mother Observation: Alignment, Breast comfortable, Close hold  Infant Observation: Audible swallows, Breast tissue moves, Feeding cues, Frenulum checked  LATCH Documentation  Latch: Grasps breast, tongue down, lips flanged, rhythmic sucking  Audible Swallowing: A few with stimulation  Type of Nipple: Everted (after stimulation)  Comfort (Breast/Nipple): Filling, red/small blisters/bruises, mild/mod discomfort  Hold (Positioning): Full assist, teach one side, mother does other, staff holds (BN tips shared)  LATCH Score: 7   Care for sore/tender nipples discussed:  ways to improve positioning and latch practiced and discussed, hand express colostrum after feedings and let air dry, light application of lanolin, hydrogel pads, seek comfortable laid back feeding position, start feedings on least sore side first.  Baby appears to have a short frenulum, information re tongue tie given to parents as mother is getting very sore, assisted mother in laid back more comfortable position, encouraged mother if soreness doesn't get better to talk to pediatrician about possibly seeing ENT. Mother has pump at home, baby's current weight loss is -3.4%. Discussed with mother adding pumping as milk is coming in and giving EBM instead of formula and offer baby breast first.  Chart shows numerous feedings (breast and formula), void, stool WNL. Discussed importance of monitoring outputs and feedings on first week of life. Discussed ways to tell if baby is  getting enough breast milk, ie  voids and stools, change in color of stool, and return to birth wt within 2 weeks. Follow up with pediatrician visit for weight check in 1-2 days (per AAP guidelines.)  Encouraged to call [facility]  [phone] or [facility] at [phone] for any questions/problems that arise. Mother also given breastfeeding support group dates and times for any future needs.

## 2018-05-14 ENCOUNTER — OFFICE VISIT (OUTPATIENT)
Dept: ENDOCRINOLOGY | Age: 28
End: 2018-05-14

## 2018-05-14 VITALS
HEART RATE: 85 BPM | HEIGHT: 60 IN | WEIGHT: 120.4 LBS | TEMPERATURE: 97.9 F | OXYGEN SATURATION: 98 % | SYSTOLIC BLOOD PRESSURE: 99 MMHG | BODY MASS INDEX: 23.64 KG/M2 | RESPIRATION RATE: 14 BRPM | DIASTOLIC BLOOD PRESSURE: 66 MMHG

## 2018-05-14 LAB
GLUCOSE POC: 128 MG/DL
HBA1C MFR BLD HPLC: 5.9 %

## 2018-05-14 NOTE — MR AVS SNAPSHOT
[address] [facility] [place] [address] [facility] [address] 
[phone] Patient: Kristin Díaz 
MRN: [id number]:[id number] Visit Information Date & Time Provider Department Dept. Phone Encounter #  
 5/14/2018  2:45 PM Vivian Wilburn MD [facility] [id number] Follow-up Instructions Return if symptoms worsen or fail to improve. Your Appointments 5/14/2018  2:45 PM  
ROUTINE CARE with Vivian Wilburn MD  
[facility] Appt Note: 4mo fu; R/S  [id number] Dr. Gia Julian [address] [place] [address] [place] [address] [facility] [address]  
[phone]  
  
   
 [address] [place] [address] [address] [facility] [address] Upcoming Health Maintenance Date Due DTaP/Tdap/Td series (1 - Tdap) 1/30/2012 PAP AKA CERVICAL CYTOLOGY 1/30/2012 Influenza Age 5 to Adult 8/1/2018 Allergies as of 5/14/2018  Review Complete On: 5/14/2018 By: Nj Morel LPN No Known Allergies Current Immunizations  Reviewed on 3/23/2018 No immunizations on file. Not reviewed this visit You Were Diagnosed With   
  
 Codes Comments Gestational diabetes mellitus (GDM), postpartum    -  Primary ICD-10-CM: O24.439 ICD-9-CM: 648.84 Vitals BP Pulse Temp Resp Height(growth percentile) Weight(growth percentile) 99/66 (BP 1 Location: Right arm, BP Patient Position: Sitting) 85 97.9 °F (36.6 °C) (Oral) 14 5' (1.524 m) 120 lb 6.4 oz (54.6 kg) SpO2 BMI OB Status Smoking Status 98% 23.51 kg/m2 Recent pregnancy Never Smoker Vitals History BMI and BSA Data Body Mass Index Body Surface Area  
 23.51 kg/m 2 1.52 m 2 Preferred Pharmacy Pharmacy Name Phone 310 [facility], [facility] 53 [address] ([address] [phone] Your Updated Medication List  
  
   
This list is accurate as of 5/14/18  2:39 PM.  Always use your most recent med list.  
  
  
  
  
 oxyCODONE-acetaminophen 5-325 mg per tablet Commonly known as:  PERCOCET Take 1 Tab by mouth every four (4) hours as needed. Max Daily Amount: 6 Tabs. PRENATAL DHA+COMPLETE PRENATAL -300 mg-mcg-mg Cmpk Generic drug:  [id number] lv-folic-dha Take  by mouth. ZOFRAN 8 mg tablet Generic drug:  ondansetron hcl Take 8 mg by mouth every eight (8) hours as needed for Nausea. We Performed the Following AMB POC GLUCOSE, QUANTITATIVE, BLOOD [98333 CPT(R)] AMB POC HEMOGLOBIN A1C [91106 CPT(R)] Follow-up Instructions Return if symptoms worsen or fail to improve. Introducing \[facility]\"" & HEALTH SERVICES! Ann Shelton introduces [employer] patient portal. Now you can access parts of your medical record, email your doctor's office, and request medication refills online. 1. In your internet browser, go to [email or web address] 2. Click on the First Time User? Click Here link in the Sign In box. You will see the New Member Sign Up page. 3. Enter your [employer] Access Code exactly as it appears below. You will not need to use this code after youve completed the sign-up process. If you do not sign up before the expiration date, you must request a new code. · [employer] Access Code: [email or web address] Expires: 5/21/2018 11:56 AM 
 
4. Enter the last four digits of your Social Security Number (xxxx) and Date of Birth (mm/dd/yyyy) as indicated and click Submit. You will be taken to the next sign-up page. 5. Create a [employer] ID. This will be your [employer] login ID and cannot be changed, so think of one that is secure and easy to remember. 6. Create a [employer] password. You can change your password at any time. 7. Enter your Password Reset Question and Answer. This can be used at a later time if you forget your password. 8. Enter your e-mail address.  You will receive e-mail notification when new information is available in [address] 9. Click Sign Up. You can now view and download portions of your medical record. 10. Click the Download Summary menu link to download a portable copy of your medical information. If you have questions, please visit the Frequently Asked Questions section of the [employer] website. Remember, [employer] is NOT to be used for urgent needs. For medical emergencies, dial 911. Now available from your iPhone and Android! Please provide this summary of care documentation to your next provider. Your primary care clinician is listed as Meme Evans. If you have any questions after today's visit, please call [phone].

## 2018-05-14 NOTE — LETTER
5/14/2018 10:25 PM 
 
Patient:  Fidel Mac YOB: 1990 Date of Visit: 5/14/2018 Dear Fermin Calvin MD 
[address] [place] [address] [address] [place] [address] Z [id number] VIA Facsimile: [phone] 
 : Thank you for referring Ms. Disha Beltran to me for evaluation/treatment. Below are the relevant portions of my assessment and plan of care. If you have questions, please do not hesitate to call me. I look forward to following Ms. Beltran along with you. Sincerely, Nilsa Menendez MD

## 2018-05-14 NOTE — PROGRESS NOTES
Kendra Noyola MD        [address] 78 [phone] Fax [phone] ( Tue-Fri)          Patient Information  Date:5/14/2018  Name : Cathi Jackson 33 y.o.     YOB: 1990         Referred by: Dawson Santizo MD       History of Present Illness: Cathi Jackson is a 33 y.o. female  here for management of Gestational DM. She delivered a healthy baby 6 weeks ago, lactating  Concentrating on healthy diet  Worried about the future risk of diabetes    Has not checked any blood glucose since delivery      Past Medical History:   Diagnosis Date    Anxiety and depression     taking medication prior to pregnancy    Gestational diabetes     Postpartum depression 2015, 2016    both previous babies, was treated with medication both times       No Known Allergies    Current Outpatient Prescriptions   Medication Sig Dispense Refill    [id number] lv-folic-dha (PRENATAL DHA+COMPLETE PRENATAL) -300 mg-mcg-mg cmpk Take  by mouth.  oxyCODONE-acetaminophen (PERCOCET) 5-325 mg per tablet Take 1 Tab by mouth every four (4) hours as needed. Max Daily Amount: 6 Tabs. 10 Tab 0    ondansetron hcl (ZOFRAN, AS HYDROCHLORIDE,) 8 mg tablet Take 8 mg by mouth every eight (8) hours as needed for Nausea.            Reviewed Family Hx,Social Hx  Review of Systems:  - Constitutional Symptoms: no fevers, chills, no weight loss  - Eyes: no blurry vision no double vision  - Cardiovascular: no chest pain or palpitations  - Respiratory: no cough no shortness of breath  - Gastrointestinal: no dysphagia no abdominal pain  - Musculoskeletal: no joint pains no  weakness  - Integumentary: no rashes  - Neurological: no numbness, tingling, no headaches  - Psychiatric: no depression no anxiety  - Endocrine: no heat or cold intolerance, no polyuria or polydipsia    Physical Examination:  Visit Vitals    BP 99/66 (BP 1 Location: Right arm, BP Patient Position: Sitting)    Pulse 85    Temp 97.9 °F (36.6 °C) (Oral)    Resp 14    Ht 5' (1.524 m)    Wt 120 lb 6.4 oz (54.6 kg)    SpO2 98%    BMI 23.51 kg/m2     - General: pleasant, no distress, good eye contact  - HEENT: no exopthalmos, no periorbital edema, EOMI, no lid lag or stare  - Neck: supple, no thyromegaly, masses  - Cardiovascular: regular, normal rate, normal S1 and S2, no murmurs  - Respiratory: clear to auscultation bilaterally  - Musculoskeletal: no proximal muscle weakness in upper or lower extremities  - Integumentary: no edema,   - Neurological:alert and oriented  - Psychiatric: normal mood and affect    Data Reviewed:     [] Glucose records reviewed. [] See glucose records for details (to be scanned). [x] A1C  [] Reviewed labs    Assessment/Plan: 1. Gestational diabetes  She delivered a healthy baby 6 weeks ago  A1c is 5.9  Discussed about future risk of diabetes and following healthy lifestyle          Thank you for allowing me to participate in the care of this patient.     Angelica Ramon MD

## 2018-05-14 NOTE — PROGRESS NOTES
Rhett Escamilla is a 33 y.o. female here for   Chief Complaint   Patient presents with    Gestational Diabetes       1. Have you been to the ER, urgent care clinic since your last visit? Hospitalized since your last visit? -[facility] March 2018 for delivery    2. Have you seen or consulted any other health care providers outside of the [address] [place] [address] since your last visit? Include any pap smears or colon screening. -OB GYN    Wt Readings from Last 3 Encounters:   03/21/18 130 lb (59 kg)   03/07/18 130 lb 4.8 oz (59.1 kg)   02/20/18 126 lb 11.2 oz (57.5 kg)     Temp Readings from Last 3 Encounters:   03/23/18 98.9 °F (37.2 °C)   03/07/18 96.5 °F (35.8 °C) (Oral)   02/20/18 98 °F (36.7 °C) (Oral)     BP Readings from Last 3 Encounters:   03/23/18 108/57   03/07/18 104/60   02/20/18 99/65     Pulse Readings from Last 3 Encounters:   03/23/18 67   03/07/18 86   02/20/18 91

## 2019-02-05 ENCOUNTER — OFFICE VISIT (OUTPATIENT)
Dept: ENDOCRINOLOGY | Age: 29
End: 2019-02-05

## 2019-02-05 VITALS
HEART RATE: 88 BPM | OXYGEN SATURATION: 99 % | TEMPERATURE: 97.9 F | BODY MASS INDEX: 23.05 KG/M2 | RESPIRATION RATE: 14 BRPM | DIASTOLIC BLOOD PRESSURE: 76 MMHG | SYSTOLIC BLOOD PRESSURE: 119 MMHG | WEIGHT: 117.4 LBS | HEIGHT: 60 IN

## 2019-02-05 DIAGNOSIS — N91.2 AMENORRHEA: Primary | ICD-10-CM

## 2019-02-05 DIAGNOSIS — R00.0 TACHYCARDIA: ICD-10-CM

## 2019-02-05 DIAGNOSIS — E55.9 VITAMIN D DEFICIENCY: ICD-10-CM

## 2019-02-05 PROBLEM — O24.419 GESTATIONAL DIABETES MELLITUS (GDM) IN THIRD TRIMESTER: Status: RESOLVED | Noted: 2018-02-20 | Resolved: 2019-02-05

## 2019-02-05 PROBLEM — Z34.90 PREGNANCY: Status: RESOLVED | Noted: 2018-03-21 | Resolved: 2019-02-05

## 2019-02-05 NOTE — PROGRESS NOTES
Arnold Thorpe is a 30 y.o. female here for Chief Complaint Patient presents with  Thyroid Problem 1. Have you been to the ER, urgent care clinic since your last visit? Hospitalized since your last visit? -no 
 
2. Have you seen or consulted any other health care providers outside of the [address] [place] [address] since your last visit? Include any pap smears or colon screening.-Dr. Citlalli Russell

## 2019-02-05 NOTE — PROGRESS NOTES
[facility] DI[facility] ENDOCRINOLOGY Angelica Ramon MD 
 
    [address] [place] [address] WN:[phone] Fax [phone] ( Tue-Fri) Patient Information Date:2/5/2019 Name : Brian Zhou 30 y.o.    
YOB: 1990 Referred by: Ramiro Smith MD  
 
 
History of Present Illness: Brian Zhou is a 30 y.o. female  here for management of Gestational DM. She is 11 months postpartum, complains of intermittent palpitations, lower abdominal pain, was told to have UTI. No weight loss Has not had any labs. She was on antibiotics for possible UTI Complains of generalized arthralgia Past Medical History:  
Diagnosis Date  Anxiety and depression   
 taking medication prior to pregnancy  Gestational diabetes  Postpartum depression 2015, 2016  
 both previous babies, was treated with medication both times No Known Allergies Current Outpatient Medications Medication Sig Dispense Refill  oxyCODONE-acetaminophen (PERCOCET) 5-325 mg per tablet Take 1 Tab by mouth every four (4) hours as needed. Max Daily Amount: 6 Tabs. 10 Tab 0  
 ondansetron hcl (ZOFRAN, AS HYDROCHLORIDE,) 8 mg tablet Take 8 mg by mouth every eight (8) hours as needed for Nausea.  [id number] lv-folic-dha (PRENATAL DHA+COMPLETE PRENATAL) -300 mg-mcg-mg cmpk Take  by mouth. Reviewed Family Hx,Social Hx Review of Systems: 
- Review of symptoms negative except as mentioned in HPI Physical Examination: 
Visit Vitals /76 (BP 1 Location: Left arm, BP Patient Position: Sitting) Pulse 88 Temp 97.9 °F (36.6 °C) (Oral) Resp 14 Ht 5' (1.524 m) Wt 117 lb 6.4 oz (53.3 kg) LMP  (LMP Unknown) SpO2 99% BMI 22.93 kg/m² - General: pleasant, no distress, good eye contact 
- HEENT: no exopthalmos, no periorbital edema, EOMI, no lid lag or stare 
- Neck: supple, no thyromegaly, masses - Cardiovascular: regular, normal rate, normal S1 and S2, no murmurs - Respiratory: clear to auscultation bilaterally - Musculoskeletal: no proximal muscle weakness in upper or lower extremities - Integumentary: no edema,  
- Neurological:alert and oriented - Psychiatric: normal mood and affect Data Reviewed:  
 
 
Assessment/Plan:  
 
Menstrual irregularity: After the delivery she had only one cycle in December 2018, delivered a healthy baby in March 2018 Not lactating Denies pregnancy Fatigue, tachycardia: Check CBC, TFTs She does check blood glucose intermittently, less than 100 Discussed about the daily vitamin D, calcium intake Self-pay, have to limit the number of labs ordered Thank you for allowing me to participate in the care of this patient. Amanda Rosenbaum MD 
 
 
Patient verbalized understanding

## 2019-02-13 LAB
25(OH)D3+25(OH)D2 SERPL-MCNC: 9.8 NG/ML (ref 30–100)
BASOPHILS # BLD AUTO: 0 X10E3/UL (ref 0–0.2)
BASOPHILS NFR BLD AUTO: 0 %
BUN SERPL-MCNC: 10 MG/DL (ref 6–20)
BUN/CREAT SERPL: 19 (ref 9–23)
CALCIUM SERPL-MCNC: 9.7 MG/DL (ref 8.7–10.2)
CHLORIDE SERPL-SCNC: 104 MMOL/L (ref 96–106)
CO2 SERPL-SCNC: 24 MMOL/L (ref 20–29)
CREAT SERPL-MCNC: 0.52 MG/DL (ref 0.57–1)
EOSINOPHIL # BLD AUTO: 0.1 X10E3/UL (ref 0–0.4)
EOSINOPHIL NFR BLD AUTO: 2 %
ERYTHROCYTE [DISTWIDTH] IN BLOOD BY AUTOMATED COUNT: 13.2 % (ref 12.3–15.4)
ESTRADIOL SERPL-MCNC: 30.1 PG/ML
FSH SERPL-ACNC: 3.7 MIU/ML
GLUCOSE SERPL-MCNC: 161 MG/DL (ref 65–99)
HCT VFR BLD AUTO: 41.8 % (ref 34–46.6)
HGB BLD-MCNC: 13.3 G/DL (ref 11.1–15.9)
IMM GRANULOCYTES # BLD AUTO: 0 X10E3/UL (ref 0–0.1)
IMM GRANULOCYTES NFR BLD AUTO: 0 %
LYMPHOCYTES # BLD AUTO: 1.7 X10E3/UL (ref 0.7–3.1)
LYMPHOCYTES NFR BLD AUTO: 31 %
MCH RBC QN AUTO: 28.7 PG (ref 26.6–33)
MCHC RBC AUTO-ENTMCNC: 31.8 G/DL (ref 31.5–35.7)
MCV RBC AUTO: 90 FL (ref 79–97)
MONOCYTES # BLD AUTO: 0.3 X10E3/UL (ref 0.1–0.9)
MONOCYTES NFR BLD AUTO: 6 %
NEUTROPHILS # BLD AUTO: 3.4 X10E3/UL (ref 1.4–7)
NEUTROPHILS NFR BLD AUTO: 61 %
PLATELET # BLD AUTO: 355 X10E3/UL (ref 150–379)
POTASSIUM SERPL-SCNC: 4.1 MMOL/L (ref 3.5–5.2)
RBC # BLD AUTO: 4.63 X10E6/UL (ref 3.77–5.28)
SODIUM SERPL-SCNC: 143 MMOL/L (ref 134–144)
T4 FREE SERPL-MCNC: 1.14 NG/DL (ref 0.82–1.77)
TSH SERPL DL<=0.005 MIU/L-ACNC: 1.02 UIU/ML (ref 0.45–4.5)
WBC # BLD AUTO: 5.6 X10E3/UL (ref 3.4–10.8)

## 2019-02-14 ENCOUNTER — TELEPHONE (OUTPATIENT)
Dept: ENDOCRINOLOGY | Age: 29
End: 2019-02-14

## 2019-02-15 NOTE — TELEPHONE ENCOUNTER
Vit D is very low    OTC Vitamin D 2000 units daily     Sugar is little high - ask her to check fasting and bedtime BG for a week and if high to call back     Rest of the tests normal

## 2019-02-15 NOTE — TELEPHONE ENCOUNTER
Informed pt that Vit D is very low, asked that she take Vit D OTC 2000 units daily. Explained BG was high during blood draw , asked that she check BG fasting in the AM and before bed for one week, If sugars are still high, call the office. Also informed her that tests are normal. Pt verbalized understanding.

## 2019-06-29 ENCOUNTER — ED HISTORICAL/CONVERTED ENCOUNTER (OUTPATIENT)
Dept: OTHER | Age: 29
End: 2019-06-29

## 2019-07-20 ENCOUNTER — ED HISTORICAL/CONVERTED ENCOUNTER (OUTPATIENT)
Dept: OTHER | Age: 29
End: 2019-07-20

## 2019-07-30 ENCOUNTER — OFFICE VISIT (OUTPATIENT)
Dept: SURGERY | Age: 29
End: 2019-07-30

## 2019-07-30 VITALS
HEART RATE: 94 BPM | BODY MASS INDEX: 22.58 KG/M2 | HEIGHT: 60 IN | DIASTOLIC BLOOD PRESSURE: 48 MMHG | RESPIRATION RATE: 14 BRPM | TEMPERATURE: 98.4 F | WEIGHT: 115 LBS | OXYGEN SATURATION: 99 % | SYSTOLIC BLOOD PRESSURE: 89 MMHG

## 2019-07-30 DIAGNOSIS — K80.20 SYMPTOMATIC CHOLELITHIASIS: Primary | ICD-10-CM

## 2019-07-30 PROBLEM — F41.9 ANXIETY: Status: ACTIVE | Noted: 2019-07-30

## 2019-07-30 PROBLEM — F32.A DEPRESSION: Status: ACTIVE | Noted: 2019-07-30

## 2019-07-30 NOTE — PROGRESS NOTES
Surgery History and Physical    Subjective:      Raoul Ponce is a 30 y.o.  female who presents for evaluation of epigastric pain and gallstones. For the past few weeks, Mrs. Isauro Mcfarlane has had intermittent epigastric pain exacerbated by eating. The pain is associated with n/v and diarrhea. She denies fever or jaundice. The is starting to last longer now and become more intense. She has GERD, but denies any h/o PUD, pancreatitis, liver disease, IBD, or IBS. She has not had any abdominal procedures. She was seen in the ER recently and had an US which revealed gallstones. Past Medical History:   Diagnosis Date    Anxiety and depression     taking medication prior to pregnancy    Gestational diabetes     Postpartum depression 2015, 2016    both previous babies, was treated with medication both times     History reviewed. No pertinent surgical history. Family History   Problem Relation Age of Onset    Hypertension Mother     Hypertension Maternal Grandmother      Social History     Tobacco Use    Smoking status: Never Smoker    Smokeless tobacco: Never Used   Substance Use Topics    Alcohol use: No      Prior to Admission medications    Medication Sig Start Date End Date Taking? Authorizing Provider   [id number] lv-folic-dha (PRENATAL DHA+COMPLETE PRENATAL) [id number] mg-mcg-mg cmpk Take  by mouth. Indications: pregnancy   Yes Provider, Historical   oxyCODONE-acetaminophen (PERCOCET) 5-325 mg per tablet Take 1 Tab by mouth every four (4) hours as needed. Max Daily Amount: 6 Tabs. 3/23/18   Rita Camarillo MD      No Known Allergies    Review of Systems:  A comprehensive review of systems was negative except for that written in the History of Present Illness.     Objective:     Physical Exam:  GENERAL: alert, cooperative, no distress, appears stated age, EYE: negative findings: anicteric sclera, LYMPHATIC: Cervical, supraclavicular nodes normal. , THROAT & NECK: normal, LUNG: clear to auscultation bilaterally, HEART: regular rate and rhythm, ABDOMEN: Soft, NT, ND. The uterus is gravid and measures a few finger breadths above the umbilicus., EXTREMITIES:  no edema, SKIN: Normal., NEUROLOGIC: negative, PSYCHIATRIC: non focal    Assessment:     Symptomatic cholelithiasis. Plan:     I have recommended conservative treatment for Mrs. Beltran while she is pregnant consisting of a low fat diet. She will need to speak with her OB or dietician regarding the specific details of the diet. I would not recommend a cholecystectomy during her pregnancy unless necessary. I have recommended that Mrs. Beltran come back to have her GB removed just after her delivery. She does not seem interested in having surgery now or after her pregnancy and, in fact, is moving out of town this weekend so will not be here to follow up with me to continue her care.

## 2019-07-30 NOTE — PROGRESS NOTES
1. Have you been to the ER, urgent care clinic since your last visit? Hospitalized since your last visit? Yes [facility] on 7/20/19 for abdominal pain    2. Have you seen or consulted any other health care providers outside of the [address] [place] [address] since your last visit? Include any pap smears or colon screening.  No

## 2020-09-29 LAB
ANTIBODY SCREEN, EXTERNAL: NEGATIVE
CHLAMYDIA, EXTERNAL: NEGATIVE
HBSAG, EXTERNAL: NEGATIVE
HIV, EXTERNAL: NEGATIVE
N. GONORRHEA, EXTERNAL: NEGATIVE
RUBELLA, EXTERNAL: NORMAL

## 2020-10-26 ENCOUNTER — OFFICE VISIT (OUTPATIENT)
Dept: ENDOCRINOLOGY | Age: 30
End: 2020-10-26
Payer: COMMERCIAL

## 2020-10-26 VITALS
HEIGHT: 60 IN | BODY MASS INDEX: 22.19 KG/M2 | WEIGHT: 113 LBS | TEMPERATURE: 97.5 F | HEART RATE: 78 BPM | DIASTOLIC BLOOD PRESSURE: 55 MMHG | RESPIRATION RATE: 14 BRPM | OXYGEN SATURATION: 100 % | SYSTOLIC BLOOD PRESSURE: 88 MMHG

## 2020-10-26 DIAGNOSIS — O24.410 DIET CONTROLLED GESTATIONAL DIABETES MELLITUS (GDM), ANTEPARTUM: Primary | ICD-10-CM

## 2020-10-26 PROCEDURE — 99214 OFFICE O/P EST MOD 30 MIN: CPT | Performed by: INTERNAL MEDICINE

## 2020-10-26 RX ORDER — INSULIN PUMP SYRINGE, 3 ML
EACH MISCELLANEOUS
Qty: 1 KIT | Refills: 0 | Status: SHIPPED | OUTPATIENT
Start: 2020-10-26 | End: 2021-01-05 | Stop reason: SDUPTHER

## 2020-10-26 RX ORDER — IBUPROFEN 200 MG
CAPSULE ORAL
Qty: 400 STRIP | Refills: 3 | Status: SHIPPED | OUTPATIENT
Start: 2020-10-26 | End: 2020-12-22 | Stop reason: SDUPTHER

## 2020-10-26 RX ORDER — CHOLECALCIFEROL TAB 125 MCG (5000 UNIT) 125 MCG
2500 TAB ORAL DAILY
COMMUNITY

## 2020-10-26 RX ORDER — LANCETS
EACH MISCELLANEOUS
Qty: 400 EACH | Refills: 3 | Status: SHIPPED | OUTPATIENT
Start: 2020-10-26 | End: 2020-12-22 | Stop reason: SDUPTHER

## 2020-10-26 NOTE — PROGRESS NOTES
Idalia Mckeon MD      Patient Information  Date:10/26/2020  Name : Gerry Adams 35 y.o.     YOB: 1990         Referred by: Melinda Tuttle MD       History of Present Illness: Gerry Adams is a 35 y.o. female  here for management of Gestational DM.   She is pregnant , 19 weeks now, failed 1 hour as well as 3-hour glucose tolerance test  She had gestational diabetes with all her pregnancies except the fourth  Diet controlled  She has been checking fasting and they are less than 90  Does not have enough strips  She had nutrition visit in the past, she would like to defer nutritional visit no    Blood pressure is on the lower end, no dizziness, nausea, vomiting, abdominal pain        Past Medical History:   Diagnosis Date    Anxiety and depression     taking medication prior to pregnancy    Gestational diabetes     Postpartum depression ,     both previous babies, was treated with medication both times       No Known Allergies    Current Outpatient Medications   Medication Sig Dispense Refill    cholecalciferol (VITAMIN D3) (5000 Units/125 mcg) tab tablet Take 2,500 Units by mouth daily.  [id number] lv-folic-dha (PRENATAL DHA+COMPLETE PRENATAL) -300 mg-mcg-mg cmpk Take  by mouth. Indications: pregnancy      oxyCODONE-acetaminophen (PERCOCET) 5-325 mg per tablet Take 1 Tab by mouth every four (4) hours as needed. Max Daily Amount: 6 Tabs.  10 Tab 0         Reviewed Family Hx,Social Hx  Review of Systems:  - Review of symptoms negative except as mentioned in HPI    Physical Examination:  Visit Vitals  BP (!) 88/55 (BP 1 Location: Right arm, BP Patient Position: Sitting)   Pulse 78   Temp 97.5 °F (36.4 °C) (Oral)   Resp 14   Ht 5' (1.524 m)   Wt 113 lb (51.3 kg)   SpO2 100%   BMI 22.07 kg/m²     - General: pleasant, no distress, good eye contact  - HEENT: no exopthalmos, no periorbital edema, EOMI, no lid lag or stare  - Neck: supple, no thyromegaly, masses  - Cardiovascular: regular, normal rate, normal S1 and S2, no murmurs  - Respiratory: clear to auscultation bilaterally  - Musculoskeletal: no proximal muscle weakness in upper or lower extremities  - Integumentary: no edema,   - Neurological:alert and oriented  - Psychiatric: normal mood and affect    Data Reviewed:       Assessment/Plan:     Gestational diabetes: The significance and usual management of diabetes complicating pregnancy were discussed, including the increased risks of miscarriage,malformations,increased  section, macrosomia,  delivery, preeclampsia and infections. We discussed the importance of good glucose control to minimize these risks. We also discussed target glucoses and achieving a near normal hemoglobin A1c. Discussed about  checking blood sugars . Focused on the importance of checking the blood sugars and sending it to the office for further titration and better control. Thank you for allowing me to participate in the care of this patient.     Bertha Chauhan MD      Patient verbalized understanding

## 2020-10-26 NOTE — LETTER
10/27/20 Patient: Ame Lam YOB: 1990 Date of Visit: 10/26/2020 Xu Zavala MD 
[address] [facility] [address] VIA Facsimile: [phone] Dear Xu Zavala MD, Thank you for referring Ms. Disha Beltran to [address] [place] [address] for evaluation. My notes for this consultation are attached. If you have questions, please do not hesitate to call me. I look forward to following your patient along with you. Sincerely, Juan C Melgoza MD

## 2020-10-26 NOTE — PROGRESS NOTES
Do Vail is a 35 y.o. female here for   Chief Complaint   Patient presents with    Missed Menses    Vitamin D Deficiency       1. Have you been to the ER, urgent care clinic since your last visit? Hospitalized since your last visit? -no    2. Have you seen or consulted any other health care providers outside of the [address] [place] [address] since your last visit? Include any pap smears or colon screening. -PCP and GYN

## 2020-10-26 NOTE — PATIENT INSTRUCTIONS
Goals for blood sugar:  - fasting: less than 90 - 95    - 2 hours after a meal: less than 120    Low sugars are less than 70 with symptoms of sweating , irritability and shakiness    Check blood sugars immediately before breakfast  and 2 hour after the meals

## 2020-11-19 PROBLEM — Z86.32 HISTORY OF GESTATIONAL DIABETES: Status: ACTIVE | Noted: 2017-05-16

## 2020-11-24 ENCOUNTER — OFFICE VISIT (OUTPATIENT)
Dept: ENDOCRINOLOGY | Age: 30
End: 2020-11-24
Payer: COMMERCIAL

## 2020-11-24 VITALS
OXYGEN SATURATION: 98 % | RESPIRATION RATE: 14 BRPM | HEART RATE: 91 BPM | WEIGHT: 115 LBS | BODY MASS INDEX: 22.58 KG/M2 | SYSTOLIC BLOOD PRESSURE: 95 MMHG | HEIGHT: 60 IN | DIASTOLIC BLOOD PRESSURE: 51 MMHG | TEMPERATURE: 97.9 F

## 2020-11-24 DIAGNOSIS — O24.410 DIET CONTROLLED GESTATIONAL DIABETES MELLITUS (GDM), ANTEPARTUM: Primary | ICD-10-CM

## 2020-11-24 LAB
EST. AVERAGE GLUCOSE BLD GHB EST-MCNC: 97 MG/DL
HBA1C MFR BLD: 5 % (ref 4–5.6)

## 2020-11-24 PROCEDURE — 99213 OFFICE O/P EST LOW 20 MIN: CPT | Performed by: INTERNAL MEDICINE

## 2020-11-24 NOTE — PROGRESS NOTES
Ayala Cody is a 35 y.o. female here for   Chief Complaint   Patient presents with    Gestational Diabetes       1. Have you been to the ER, urgent care clinic since your last visit? Hospitalized since your last visit? -no    2. Have you seen or consulted any other health care providers outside of the [address] [place] [address] since your last visit?   Include any pap smears or colon screening.-no

## 2020-11-24 NOTE — PROGRESS NOTES
Chata Orozco MD      Patient Information  Date:2020  Name : Usman Navarrete 35 y.o.     YOB: 1990         Referred by:       History of Present Illness: Usman Navarrete is a 35 y.o. female  here for follow-up of gestational DM.   She is pregnant , 23 weeks now, failed 1 hour as well as 3-hour glucose tolerance test  She had gestational diabetes with all her pregnancies except the fourth  Diet controlled  She is checking blood glucose 4 times daily  Blood glucose are at goal    She had nutrition visit in the past, she would like to defer nutritional visit no    Blood pressure is on the lower end, no dizziness, nausea, vomiting, abdominal pain        Past Medical History:   Diagnosis Date    Anxiety and depression     taking medication prior to pregnancy    Gestational diabetes     Postpartum depression ,     both previous babies, was treated with medication both times       No Known Allergies    Current Outpatient Medications   Medication Sig Dispense Refill    cholecalciferol (VITAMIN D3) (5000 Units/125 mcg) tab tablet Take 2,500 Units by mouth daily.  Blood-Glucose Meter monitoring kit Test QID Dx Code: O24.410 1 Kit 0    glucose blood VI test strips (blood glucose test) strip Test QID Dx Code: O24.410 400 Strip 3    lancets misc Test TID Dx Code: O24.410 400 Each 3    oxyCODONE-acetaminophen (PERCOCET) 5-325 mg per tablet Take 1 Tab by mouth every four (4) hours as needed. Max Daily Amount: 6 Tabs. 10 Tab 0    [id number] lv-folic-dha (PRENATAL DHA+COMPLETE PRENATAL) -300 mg-mcg-mg cmpk Take  by mouth. Indications: pregnancy           Reviewed Family Hx,Social Hx  Review of Systems:  - Review of symptoms negative except as mentioned in HPI    Physical Examination:  There were no vitals taken for this visit.   - General: pleasant, no distress, good eye contact  - HEENT: no exopthalmos, no periorbital edema, EOMI, no lid lag or stare  - Neck: supple, no thyromegaly  - Cardiovascular: regular, normal rate, normal S1 and S2, no murmurs  - Respiratory: clear to auscultation bilaterally  - Musculoskeletal: no proximal muscle weakness in upper or lower extremities  - Integumentary: no edema,   - Neurological:alert and oriented  - Psychiatric: normal mood and affect    Data Reviewed:       Assessment/Plan:     Gestational diabetes:  Controlled  Lab Results   Component Value Date/Time    Hemoglobin A1c 5.0 11/24/2020 11:49 AM    Hemoglobin A1c (POC) 5.9 05/14/2018 02:15 PM     Continue monitoring and diet                Thank you for allowing me to participate in the care of this patient.     Tyra Alan MD      Patient verbalized understanding

## 2020-11-24 NOTE — LETTER
11/27/20 Patient: Do Vail YOB: 1990 Date of Visit: 11/24/2020 Juliet Vazquez MD 
[address] [place] [address] VIA Facsimile: [phone] Dear Juliet Vazquez MD, Thank you for referring Ms. Disha Beltran to [address] for evaluation. My notes for this consultation are attached. If you have questions, please do not hesitate to call me. I look forward to following your patient along with you. Sincerely, Seble Grigsby MD

## 2020-12-22 ENCOUNTER — TELEPHONE (OUTPATIENT)
Dept: ENDOCRINOLOGY | Age: 30
End: 2020-12-22

## 2020-12-22 DIAGNOSIS — O24.410 DIET CONTROLLED GESTATIONAL DIABETES MELLITUS (GDM), ANTEPARTUM: ICD-10-CM

## 2020-12-22 RX ORDER — IBUPROFEN 200 MG
CAPSULE ORAL
Qty: 400 STRIP | Refills: 3 | Status: SHIPPED | OUTPATIENT
Start: 2020-12-22 | End: 2021-01-05 | Stop reason: SDUPTHER

## 2020-12-22 RX ORDER — LANCETS
EACH MISCELLANEOUS
Qty: 400 EACH | Refills: 3 | Status: SHIPPED | OUTPATIENT
Start: 2020-12-22 | End: 2021-01-05 | Stop reason: SDUPTHER

## 2020-12-22 NOTE — TELEPHONE ENCOUNTER
----- Message from Steffi Menendez sent at 12/22/2020  1:54 PM EST -----  Regarding: Dr. Beny Jackson (if not patient):      Relationship of caller (if not patient):      Best contact number(s):[phone]      Name of medication and dosage if known: Test Strips and needles       Is patient out of this medication (yes/no): yes      Pharmacy name: [facility]     Pharmacy listed in chart? (yes/no): no  Pharmacy phone number:[phone]      Details to clarify the request:      Steffi Menendez

## 2021-01-05 ENCOUNTER — OFFICE VISIT (OUTPATIENT)
Dept: ENDOCRINOLOGY | Age: 31
End: 2021-01-05
Payer: COMMERCIAL

## 2021-01-05 VITALS
WEIGHT: 119 LBS | RESPIRATION RATE: 14 BRPM | HEART RATE: 83 BPM | SYSTOLIC BLOOD PRESSURE: 86 MMHG | HEIGHT: 60 IN | TEMPERATURE: 97.5 F | OXYGEN SATURATION: 100 % | DIASTOLIC BLOOD PRESSURE: 51 MMHG | BODY MASS INDEX: 23.36 KG/M2

## 2021-01-05 DIAGNOSIS — I95.89 OTHER SPECIFIED HYPOTENSION: ICD-10-CM

## 2021-01-05 DIAGNOSIS — O24.410 DIET CONTROLLED GESTATIONAL DIABETES MELLITUS (GDM) IN THIRD TRIMESTER: Primary | ICD-10-CM

## 2021-01-05 LAB — HBA1C MFR BLD HPLC: 5.1 %

## 2021-01-05 PROCEDURE — 99213 OFFICE O/P EST LOW 20 MIN: CPT | Performed by: INTERNAL MEDICINE

## 2021-01-05 PROCEDURE — 83036 HEMOGLOBIN GLYCOSYLATED A1C: CPT | Performed by: INTERNAL MEDICINE

## 2021-01-05 RX ORDER — LANCETS 33 GAUGE
EACH MISCELLANEOUS
Qty: 200 LANCET | Refills: 11 | Status: SHIPPED | OUTPATIENT
Start: 2021-01-05 | End: 2022-05-04

## 2021-01-05 RX ORDER — INSULIN PUMP SYRINGE, 3 ML
EACH MISCELLANEOUS
Qty: 1 KIT | Refills: 0 | Status: SHIPPED | OUTPATIENT
Start: 2021-01-05 | End: 2022-05-04

## 2021-01-05 NOTE — PROGRESS NOTES
Wicho Walker MD      Patient Information  Date:2021  Name : Zara Ballesteros 35 y.o.     YOB: 1990         Referred by:       History of Present Illness: Zara Ballesteros is a 35 y.o. female  here for follow-up of gestational DM.   She is pregnant , 28 weeks now, failed 1 hour as well as 3-hour glucose tolerance test  She had gestational diabetes with all her pregnancies except the fourth  Diet controlled  She was checking the blood glucose until recently, has no strips, we sent the prescription to the pharmacy on 2020,       She had nutrition visit in the past, she would like to defer nutritional visit no    Blood pressure is on the lower end, no dizziness, nausea, vomiting, abdominal pain        Past Medical History:   Diagnosis Date    Anxiety and depression     taking medication prior to pregnancy    Gestational diabetes     Postpartum depression ,     both previous babies, was treated with medication both times       No Known Allergies    Current Outpatient Medications   Medication Sig Dispense Refill    glucose blood VI test strips (blood glucose test) strip Test QID Dx Code: O24.410 400 Strip 3    lancets misc Test QID Dx Code: O24.410 400 Each 3    MAGNESIUM PO Take 1 Tab by mouth daily.  CALCIUM PO Take 1 Tab by mouth daily.  cholecalciferol (VITAMIN D3) (5000 Units/125 mcg) tab tablet Take 2,500 Units by mouth daily.  Blood-Glucose Meter monitoring kit Test QID Dx Code: O24.410 1 Kit 0    [id number] lv-folic-dha (PRENATAL DHA+COMPLETE PRENATAL) -300 mg-mcg-mg cmpk Take  by mouth. Indications: pregnancy      oxyCODONE-acetaminophen (PERCOCET) 5-325 mg per tablet Take 1 Tab by mouth every four (4) hours as needed. Max Daily Amount: 6 Tabs.  10 Tab 0         Reviewed Family Hx,Social Hx  Review of Systems:  - Review of symptoms negative except as mentioned in HPI    Physical Examination:  Visit Vitals  BP (!) 86/51 (BP 1 Location: Right arm, BP Patient Position: Sitting)   Pulse 83   Temp 97.5 °F (36.4 °C) (Oral)   Resp 14   Ht 5' (1.524 m)   Wt 119 lb (54 kg)   SpO2 100%   BMI 23.24 kg/m²     - General: pleasant, no distress, good eye contact  - HEENT: no exopthalmos, no periorbital edema, EOMI, no lid lag or stare  - Neck: supple, no thyromegaly  - Cardiovascular: regular, normal rate, normal S1 and S2,  - Respiratory: clear to auscultation bilaterally  - Musculoskeletal: no proximal muscle weakness in upper or lower extremities  - Integumentary: no edema,   - Neurological:alert and oriented  - Psychiatric: normal mood and affect    Data Reviewed:       Assessment/Plan:     Gestational diabetes:  Controlled  Lab Results   Component Value Date/Time    Hemoglobin A1c 5.0 11/24/2020 11:49 AM    Hemoglobin A1c (POC) 5.9 05/14/2018 02:15 PM     Continue monitoring and diet    Blood pressure on the lower end, asymptomatic            Thank you for allowing me to participate in the care of this patient.     Luca Tiwari MD      Patient verbalized understanding

## 2021-01-05 NOTE — LETTER
1/5/2021 Patient: Patel Hendrix YOB: 1990 Date of Visit: 1/5/2021 Nadira Kowalski MD 
[address] [place] [address] Via Fax: [phone] Dear Nadira Kowalski MD, Thank you for referring Ms. Disha Beltran to [id number] [place] [address] for evaluation. My notes for this consultation are attached. If you have questions, please do not hesitate to call me. I look forward to following your patient along with you. Sincerely, Ba Bell MD

## 2021-01-05 NOTE — PROGRESS NOTES
Chance Douglas is a 35 y.o. female here for   Chief Complaint   Patient presents with    Gestational Diabetes       1. Have you been to the ER, urgent care clinic since your last visit? Hospitalized since your last visit? -no    2. Have you seen or consulted any other health care providers outside of the [address] [place] [address] since your last visit? Include any pap smears or colon screening. -OBGYN

## 2021-01-28 PROBLEM — O24.410 DIET CONTROLLED GESTATIONAL DIABETES MELLITUS (GDM), ANTEPARTUM: Status: ACTIVE | Noted: 2018-02-20

## 2021-02-03 ENCOUNTER — OFFICE VISIT (OUTPATIENT)
Dept: ENDOCRINOLOGY | Age: 31
End: 2021-02-03
Payer: COMMERCIAL

## 2021-02-03 VITALS
HEART RATE: 89 BPM | OXYGEN SATURATION: 98 % | SYSTOLIC BLOOD PRESSURE: 100 MMHG | WEIGHT: 126.4 LBS | DIASTOLIC BLOOD PRESSURE: 61 MMHG | RESPIRATION RATE: 18 BRPM | HEIGHT: 60 IN | BODY MASS INDEX: 24.81 KG/M2 | TEMPERATURE: 97.8 F

## 2021-02-03 DIAGNOSIS — O24.410 DIET CONTROLLED GESTATIONAL DIABETES MELLITUS (GDM) IN THIRD TRIMESTER: Primary | ICD-10-CM

## 2021-02-03 PROCEDURE — 99214 OFFICE O/P EST MOD 30 MIN: CPT | Performed by: INTERNAL MEDICINE

## 2021-02-03 NOTE — PROGRESS NOTES
Cristobal Hernandez MD      Patient Information  Name : Sakina Messer 28 y.o.   YOB: 1990         Referred by:       History of Present Illness: Sakina Messer is a 28 y.o. female  here for follow-up of gestational DM.   She is pregnant , 32 weeks, failed 1 hour as well as 3-hour glucose tolerance test  She had gestational diabetes with all her pregnancies except the fourth  Diet controlled     ROB     She had nutrition visit in the past, she would like to defer nutritional visit now    Blood pressure is on the lower end, no dizziness, nausea, vomiting, abdominal pain        Past Medical History:   Diagnosis Date    Anxiety and depression     taking medication prior to pregnancy    Gestational diabetes     Postpartum depression ,     both previous babies, was treated with medication both times       No Known Allergies    Current Outpatient Medications   Medication Sig Dispense Refill    glucose blood VI test strips (OneTouch Ultra Blue Test Strip) strip Test QID Dx Code: O24.410 200 Strip 11    Blood-Glucose Meter (OneTouch UltraMini) monitoring kit Test QID Dx Code: O24.410 1 Kit 0    lancets (OneTouch Delica Plus Lancet) 33 gauge misc Test QID Dx Code: O24.410 200 Lancet 11    MAGNESIUM PO Take 1 Tab by mouth daily.  CALCIUM PO Take 1 Tab by mouth daily.  cholecalciferol (VITAMIN D3) (5000 Units/125 mcg) tab tablet Take 2,500 Units by mouth daily.  [id number] lv-folic-dha (PRENATAL DHA+COMPLETE PRENATAL) -300 mg-mcg-mg cmpk Take  by mouth. Indications: pregnancy      oxyCODONE-acetaminophen (PERCOCET) 5-325 mg per tablet Take 1 Tab by mouth every four (4) hours as needed. Max Daily Amount: 6 Tabs.  10 Tab 0         Reviewed Family Hx,Social Hx  Review of Systems:  - Review of symptoms negative except as mentioned in HPI    Physical Examination:  Visit Vitals  /61 (BP 1 Location: Left upper arm, BP Patient Position: Sitting, BP Cuff Size: Adult)   Pulse 89   Temp 97.8 °F (36.6 °C) (Oral)   Resp 18   Ht 5' (1.524 m)   Wt 126 lb 6.4 oz (57.3 kg)   SpO2 98%   BMI 24.69 kg/m²     - General: pleasant, no distress, good eye contact  - HEENT: no exopthalmos, no periorbital edema, EOMI, no lid lag or stare  - Neck: supple, no thyromegaly  - Cardiovascular: regular, normal rate, normal S1 and S2,  - Respiratory: clear to auscultation bilaterally  - Musculoskeletal: no proximal muscle weakness in upper or lower extremities  - Integumentary: no edema,   - Neurological:alert and oriented  - Psychiatric: normal mood and affect    Data Reviewed:       Assessment/Plan:     Gestational diabetes:  Controlled  Lab Results   Component Value Date/Time    Hemoglobin A1c 5.0 11/24/2020 11:49 AM    Hemoglobin A1c (POC) 5.1 01/05/2021 11:15 AM   Reviewed the blood glucose log, she is checking and maintaining a very good log  Fasting less than 95, postprandial less than 120    Continue monitoring and diet            Thank you for allowing me to participate in the care of this patient.     Lori Cheema MD      Patient verbalized understanding

## 2021-02-03 NOTE — PROGRESS NOTES
Room 2    Identified pt with two pt identifiers(name and ). Reviewed record in preparation for visit and have obtained necessary documentation. All patient medications has been reviewed. Chief Complaint   Patient presents with    Gestational Diabetes       3 most recent PHQ Screens 2/3/2021   Little interest or pleasure in doing things Not at all   Feeling down, depressed, irritable, or hopeless Not at all   Total Score PHQ 2 0     Abuse Screening Questionnaire 2/3/2021   Do you ever feel afraid of your partner? N   Are you in a relationship with someone who physically or mentally threatens you? N   Is it safe for you to go home? Y       Health Maintenance Review: Patient reminded of \"due or due soon\" health maintenance. I have asked the patient to contact his/her primary care provider (PCP) for follow-up on his/her health maintenance. Vitals:    21 0839   BP: 100/61   Pulse: 89   Resp: 18   Temp: 97.8 °F (36.6 °C)   TempSrc: Oral   SpO2: 98%   Weight: 126 lb 6.4 oz (57.3 kg)   Height: 5' (1.524 m)   PainSc:   0 - No pain       Wt Readings from Last 3 Encounters:   21 126 lb 6.4 oz (57.3 kg)   21 119 lb (54 kg)   20 115 lb (52.2 kg)     Temp Readings from Last 3 Encounters:   21 97.8 °F (36.6 °C) (Oral)   21 97.5 °F (36.4 °C) (Oral)   20 97.9 °F (36.6 °C) (Oral)     BP Readings from Last 3 Encounters:   21 100/61   21 (!) 86/51   20 (!) 95/51     Pulse Readings from Last 3 Encounters:   21 89   21 83   20 91       Lab Results   Component Value Date/Time    Hemoglobin A1c 5.0 2020 11:49 AM    Hemoglobin A1c (POC) 5.1 2021 11:15 AM       Coordination of Care Questionnaire:   1) Have you been to an emergency room, urgent care, or hospitalized since your last visit?   no       2. Have seen or consulted any other health care provider since your last visit?  NO    Advance Care Planning:   End of Life Planning: DNI/DNR    Patient is accompanied by self I have received verbal consent from [address] [place] [address] Kali Ramirez [address] to discuss any/all medical information while they are present in the room.

## 2021-02-03 NOTE — LETTER
2/4/2021 Patient: Marek Churchill YOB: 1990 Date of Visit: 2/3/2021 Boy Whitfield MD 
[address] [place] [address] Via Fax: [phone] Dear Boy Whitfield MD, Thank you for referring Ms. Disha Beltran to [id number] [place] [address] for evaluation. My notes for this consultation are attached. If you have questions, please do not hesitate to call me. I look forward to following your patient along with you. Sincerely, Chely Velásquez MD

## 2021-03-03 ENCOUNTER — OFFICE VISIT (OUTPATIENT)
Dept: ENDOCRINOLOGY | Age: 31
End: 2021-03-03
Payer: COMMERCIAL

## 2021-03-03 VITALS
HEIGHT: 60 IN | HEART RATE: 90 BPM | WEIGHT: 131 LBS | TEMPERATURE: 97.3 F | SYSTOLIC BLOOD PRESSURE: 97 MMHG | OXYGEN SATURATION: 98 % | RESPIRATION RATE: 16 BRPM | BODY MASS INDEX: 25.72 KG/M2 | DIASTOLIC BLOOD PRESSURE: 52 MMHG

## 2021-03-03 DIAGNOSIS — O24.410 DIET CONTROLLED GESTATIONAL DIABETES MELLITUS (GDM), ANTEPARTUM: Primary | ICD-10-CM

## 2021-03-03 PROCEDURE — 99213 OFFICE O/P EST LOW 20 MIN: CPT | Performed by: INTERNAL MEDICINE

## 2021-03-03 NOTE — PROGRESS NOTES
Raoul Esteban is a 28 y.o. female here for   Chief Complaint   Patient presents with    Gestational Diabetes       1. Have you been to the ER, urgent care clinic since your last visit? Hospitalized since your last visit? -no    2. Have you seen or consulted any other health care providers outside of the [address] [place] [address] since your last visit? Include any pap smears or colon screening. -OBGYN

## 2021-03-03 NOTE — PROGRESS NOTES
Simeon Membreno MD      Patient Information  Name : Oliva Gar 28 y.o.   YOB: 1990         Referred by:       History of Present Illness: Oliva Gar is a 28 y.o. female  here for follow-up of gestational DM.   She is pregnant , 32 weeks, failed 1 hour as well as 3-hour glucose tolerance test  She had gestational diabetes with all her pregnancies except the fourth  Diet controlled  Has the log with her   ROB     She had nutrition visit in the past, she would like to defer nutritional visit now    Blood pressure is on the lower end, no dizziness, nausea, vomiting, abdominal pain        Past Medical History:   Diagnosis Date    Anxiety and depression     taking medication prior to pregnancy    Gestational diabetes     Postpartum depression ,     both previous babies, was treated with medication both times       No Known Allergies    Current Outpatient Medications   Medication Sig Dispense Refill    glucose blood VI test strips (OneTouch Ultra Blue Test Strip) strip Test QID Dx Code: O24.410 200 Strip 11    Blood-Glucose Meter (OneTouch UltraMini) monitoring kit Test QID Dx Code: O24.410 1 Kit 0    lancets (OneTouch Delica Plus Lancet) 33 gauge misc Test QID Dx Code: O24.410 200 Lancet 11    MAGNESIUM PO Take 1 Tab by mouth daily.  CALCIUM PO Take 1 Tab by mouth daily.  cholecalciferol (VITAMIN D3) (5000 Units/125 mcg) tab tablet Take 2,500 Units by mouth daily.  [id number] lv-folic-dha (PRENATAL DHA+COMPLETE PRENATAL) -300 mg-mcg-mg cmpk Take  by mouth. Indications: pregnancy      oxyCODONE-acetaminophen (PERCOCET) 5-325 mg per tablet Take 1 Tab by mouth every four (4) hours as needed. Max Daily Amount: 6 Tabs.  10 Tab 0         Reviewed Family Hx,Social Hx  Review of Systems:  - Review of symptoms negative except as mentioned in HPI    Physical Examination:  Visit Vitals  BP (!) 97/52 (BP 1 Location: Right arm, BP Patient Position: Sitting, BP Cuff Size: Adult)   Pulse 90   Temp 97.3 °F (36.3 °C) (Oral)   Resp 16   Ht 5' (1.524 m)   Wt 131 lb (59.4 kg)   SpO2 98%   BMI 25.58 kg/m²     - General: pleasant, no distress, good eye contact  - HEENT: no exopthalmos, no periorbital edema, EOMI, no lid lag or stare  - Neck: supple, no thyromegaly  - Cardiovascular: regular, normal rate, normal S1 and S2,  - Respiratory: clear to auscultation bilaterally  - Musculoskeletal: no proximal muscle weakness in upper or lower extremities  - Integumentary: no edema,   - Neurological:alert and oriented  - Psychiatric: normal mood and affect    Data Reviewed:       Assessment/Plan:     Gestational diabetes:  Controlled  Lab Results   Component Value Date/Time    Hemoglobin A1c 5.0 11/24/2020 11:49 AM    Hemoglobin A1c (POC) 5.1 01/05/2021 11:15 AM   Reviewed the blood glucose log, she is checking and maintaining a very good log  Fasting less than 95, postprandial less than 120    Continue monitoring and diet            Thank you for allowing me to participate in the care of this patient.     Daksha Rodriguez MD      Patient verbalized understanding

## 2021-03-03 NOTE — LETTER
3/3/2021 
 
Patient: Disha Beltran  
YOB: 1990  
Date of Visit: 3/3/2021  
 
Williams Machuca MD 
[address] [place] [address] 
Via Fax: [phone] 
 
Dear Williams Machuca MD, 
 
 
Thank you for referring Ms. Disha Beltran to [facility] for evaluation. My notes for this consultation are attached. 
 
If you have questions, please do not hesitate to call me. I look forward to following your patient along with you. 
 
 
Sincerely, 
 
Charlene Fernandez MD

## 2021-03-08 ENCOUNTER — HOSPITAL ENCOUNTER (OUTPATIENT)
Dept: PERINATAL CARE | Age: 31
Discharge: HOME | End: 2021-03-08
Attending: OBSTETRICS & GYNECOLOGY
Payer: COMMERCIAL

## 2021-03-08 PROCEDURE — 76819 FETAL BIOPHYS PROFIL W/O NST: CPT | Performed by: OBSTETRICS & GYNECOLOGY

## 2021-03-08 PROCEDURE — 76805 OB US >/= 14 WKS SNGL FETUS: CPT | Performed by: OBSTETRICS & GYNECOLOGY

## 2021-03-15 ENCOUNTER — HOSPITAL ENCOUNTER (OUTPATIENT)
Dept: PERINATAL CARE | Age: 31
Discharge: HOME | End: 2021-03-15
Attending: OBSTETRICS & GYNECOLOGY
Payer: COMMERCIAL

## 2021-03-15 PROCEDURE — 76819 FETAL BIOPHYS PROFIL W/O NST: CPT | Performed by: OBSTETRICS & GYNECOLOGY

## 2021-03-27 ENCOUNTER — HOSPITAL ENCOUNTER (INPATIENT)
Age: 31
LOS: 1 days | Discharge: HOME | DRG: 560 | End: 2021-03-29
Attending: OBSTETRICS & GYNECOLOGY | Admitting: OBSTETRICS & GYNECOLOGY
Payer: COMMERCIAL

## 2021-03-27 PROCEDURE — 0KQM0ZZ REPAIR PERINEUM MUSCLE, OPEN APPROACH: ICD-10-PCS | Performed by: OBSTETRICS & GYNECOLOGY

## 2021-03-27 PROCEDURE — 75410000003 HC RECOV DEL/VAG/CSECN EA 0.5 HR: Performed by: OBSTETRICS & GYNECOLOGY

## 2021-03-27 PROCEDURE — 75410000002 HC LABOR FEE PER 1 HR: Performed by: OBSTETRICS & GYNECOLOGY

## 2021-03-27 PROCEDURE — 75410000000 HC DELIVERY VAGINAL/SINGLE: Performed by: OBSTETRICS & GYNECOLOGY

## 2021-03-27 PROCEDURE — 75810000275 HC EMERGENCY DEPT VISIT NO LEVEL OF CARE

## 2021-03-27 RX ORDER — LIDOCAINE HYDROCHLORIDE 10 MG/ML
INJECTION INFILTRATION; PERINEURAL
Status: DISPENSED
Start: 2021-03-27 | End: 2021-03-28

## 2021-03-28 LAB
GLUCOSE BLD STRIP.AUTO-MCNC: 118 MG/DL (ref 65–100)
GRBS, EXTERNAL: NEGATIVE
SERVICE CMNT-IMP: ABNORMAL

## 2021-03-28 PROCEDURE — 65270000029 HC RM PRIVATE

## 2021-03-28 PROCEDURE — 74011250637 HC RX REV CODE- 250/637: Performed by: OBSTETRICS & GYNECOLOGY

## 2021-03-28 PROCEDURE — 82962 GLUCOSE BLOOD TEST: CPT

## 2021-03-28 RX ORDER — OXYTOCIN/RINGER'S LACTATE 30/500 ML
87.3 PLASTIC BAG, INJECTION (ML) INTRAVENOUS AS NEEDED
Status: DISCONTINUED | OUTPATIENT
Start: 2021-03-28 | End: 2021-03-29 | Stop reason: HOSPADM

## 2021-03-28 RX ORDER — DOCUSATE SODIUM 100 MG/1
100 CAPSULE, LIQUID FILLED ORAL
Status: DISCONTINUED | OUTPATIENT
Start: 2021-03-28 | End: 2021-03-29 | Stop reason: HOSPADM

## 2021-03-28 RX ORDER — NALOXONE HYDROCHLORIDE 0.4 MG/ML
0.4 INJECTION, SOLUTION INTRAMUSCULAR; INTRAVENOUS; SUBCUTANEOUS AS NEEDED
Status: DISCONTINUED | OUTPATIENT
Start: 2021-03-28 | End: 2021-03-29 | Stop reason: HOSPADM

## 2021-03-28 RX ORDER — ZOLPIDEM TARTRATE 5 MG/1
5 TABLET ORAL
Status: DISCONTINUED | OUTPATIENT
Start: 2021-03-28 | End: 2021-03-29 | Stop reason: HOSPADM

## 2021-03-28 RX ORDER — DIPHENHYDRAMINE HYDROCHLORIDE 50 MG/ML
12.5 INJECTION, SOLUTION INTRAMUSCULAR; INTRAVENOUS
Status: DISCONTINUED | OUTPATIENT
Start: 2021-03-28 | End: 2021-03-29 | Stop reason: HOSPADM

## 2021-03-28 RX ORDER — SIMETHICONE 80 MG
80 TABLET,CHEWABLE ORAL
Status: DISCONTINUED | OUTPATIENT
Start: 2021-03-28 | End: 2021-03-29 | Stop reason: HOSPADM

## 2021-03-28 RX ORDER — HYDROCORTISONE ACETATE PRAMOXINE HCL 2.5; 1 G/100G; G/100G
CREAM TOPICAL AS NEEDED
Status: DISCONTINUED | OUTPATIENT
Start: 2021-03-28 | End: 2021-03-28 | Stop reason: RX

## 2021-03-28 RX ORDER — OXYCODONE AND ACETAMINOPHEN 5; 325 MG/1; MG/1
1 TABLET ORAL
Status: DISCONTINUED | OUTPATIENT
Start: 2021-03-28 | End: 2021-03-29 | Stop reason: HOSPADM

## 2021-03-28 RX ORDER — IBUPROFEN 800 MG/1
800 TABLET ORAL EVERY 8 HOURS
Status: DISCONTINUED | OUTPATIENT
Start: 2021-03-28 | End: 2021-03-29 | Stop reason: HOSPADM

## 2021-03-28 RX ORDER — ONDANSETRON 4 MG/1
4 TABLET, ORALLY DISINTEGRATING ORAL
Status: ACTIVE | OUTPATIENT
Start: 2021-03-28 | End: 2021-03-29

## 2021-03-28 RX ORDER — OXYTOCIN/RINGER'S LACTATE 30/500 ML
10 PLASTIC BAG, INJECTION (ML) INTRAVENOUS AS NEEDED
Status: DISCONTINUED | OUTPATIENT
Start: 2021-03-28 | End: 2021-03-29 | Stop reason: HOSPADM

## 2021-03-28 RX ORDER — HYDROMORPHONE HYDROCHLORIDE 1 MG/ML
1 INJECTION, SOLUTION INTRAMUSCULAR; INTRAVENOUS; SUBCUTANEOUS
Status: DISCONTINUED | OUTPATIENT
Start: 2021-03-28 | End: 2021-03-29 | Stop reason: HOSPADM

## 2021-03-28 RX ADMIN — DOCUSATE SODIUM 100 MG: 100 CAPSULE, LIQUID FILLED ORAL at 05:57

## 2021-03-28 RX ADMIN — IBUPROFEN 800 MG: 800 TABLET, FILM COATED ORAL at 05:57

## 2021-03-28 RX ADMIN — IBUPROFEN 800 MG: 800 TABLET, FILM COATED ORAL at 15:08

## 2021-03-28 RX ADMIN — IBUPROFEN 800 MG: 800 TABLET, FILM COATED ORAL at 23:04

## 2021-03-28 NOTE — PROGRESS NOTES
Patient a  admitted to L&D for rule out labor. Patient states her labor started at 2030. Pt denies leaking of fluid or vaginal bleeding. Patient states she has had an uncomplicated pregnancy and has a 6,2,2,1.7 year old at home. Patient presents with , Zaira Green. - Patient place on external fetal monitoring. Abdomen palpated, toco placed, Patient reports good fetal movement and declines intrapartum issues. - Consents signed by RN and patient/. SVE performed by NEYMAR Aguilar (10/100/+2). Bulging bag of water palpated. - MD called to room. Patient reports needing to bear down. Patient actively bearing down with contraction. - Dr. Higinio Hsu in room. Patient placed in stirrups and prepared for delivery. - SROM, light meconum stained fluid. -  of live male infant. See delivery note. - IM Pitocin given right vastus lateralis, 23 g by BOLA Hermosillo per Dr. Higinio Hsu. - Delivery QBL= 179 ml    0000- 2 hour post delivery QBL= 195 mL    0115- Patient up to restroom. Patient voided 600 cc of blood tinged urine. Patient ambulated well to restroom and placed in wheelchair after voiding for transfer to [facility]- Bedside report given to [facility]Y by BOLA Hermosillo

## 2021-03-28 NOTE — ROUTINE PROCESS
Bedside and Verbal shift change report given to KYLAH Quintero RN (oncoming nurse) by GUS Gonzalez RN (offgoing nurse). Report included the following information SBAR, Kardex, Intake/Output and MAR.

## 2021-03-28 NOTE — DISCHARGE SUMMARY
Patient ID:  Kena Oswald  [id number]  28 y.o.  1990    Admit Date: 3/27/2021    Discharge Date: 3/28/2021     Admitting Physician: Chadwick Matt MD    Attending Physician: Nicholas Najera MD    Admission Diagnoses: Labor and delivery, indication for care [O75.9]    Procedures for this admission:     Discharge Diagnoses: Same as above with vaginally producing a viable infant. Information for the patient's :  Cadence Lambert [[id number]]            Discharge Disposition:  home    Discharge Condition:  stable    Additional Diagnoses: diabetes - gestational.     Maternal Labs:   Lab Results   Component Value Date/Time    HBsAg, External Negative 2020    HIV, External Negative 2020    Rubella, External Immune 2020    RPR, External non reactive 10/03/2017    GrBStrep, External Negative 2021       Cord Blood Results:   Information for the patient's :  Cadence Lambert [[id number]]     Lab Results   Component Value Date/Time    ABO/Rh(D) Jose Sextonith POSITIVE 2021 10:44 PM    AMARJIT IgG NEG 2021 10:44 PM    Bilirubin if AMARJIT pos: IF DIRECT SAM POSITIVE, BILIRUBIN TO FOLLOW 2021 10:44 PM            History of Present Illness:   OB History        5    Para   5    Term   3            AB        Living   2       SAB        TAB        Ectopic        Molar        Multiple   0    Live Births   2              Admitted for active labor. Hospital Course:   Patient was admitted as above and delivered via vagina by DR. Hadley . Please the chart for details. The postpartum course was unremarkable. She was deemed stable for discharge home on day 2.     Follow-up Care: 4-6 weeks        Signed:  Tray Loyola MD  3/28/2021  10:59 AM

## 2021-03-28 NOTE — ROUTINE PROCESS
SBAR IN Report: Mother Verbal report received from [address] [place] [address] (full name & credentials) on this patient, who is now being transferred from L&D (unit) for routine progression of care. Report consisted of patient's Situation, Background, Assessment and Recommendations (SBAR). [place] ID bands were compared with the identification form, and verified with the patient and transferring nurse. Information from the SBAR, Kardex, Intake/Output, MAR and Accordion and the [place] Report was reviewed with the transferring nurse; opportunity for questions and clarification provided.

## 2021-03-28 NOTE — LACTATION NOTE
This note was copied from a baby's chart. Discussed with mother her plan for feeding. Reviewed the benefits of exclusive breast milk feeding during the hospital stay. Informed her of the risks of using formula to supplement in the first few days of life as well as the benefits of successful breast milk feeding; referred her to the Breastfeeding booklet about this information. She acknowledges understanding of information reviewed and states that it is her plan to breast and bottle feed her infant. Will support her choice and offer additional information as needed. Reviewed breastfeeding techniques and positions with mother until found a position she was most comfortable with. Reminded mother of early feeding cues and that breast fed infants should be fed on demand without time restriction on the first breast until the infant seems satisfied. Then the second breast is offered. Advised mother to awaken  to feed if three hours have passed since baby last ate. Will continue to monitor mother's progress with breastfeeding and offer assistance at any time. Pt will successfully establish breastfeeding by feeding in response to early feeding cues or wake every 3h, will obtain deep latch, and will keep log of feedings/output. Taught to BF at hunger cues and or q 2-3 hrs and to offer 10-20 drops of hand expressed colostrum at any non-feeds.       Breast Assessment  Left Breast: Medium  Left Nipple: Everted, Intact  Right Breast: Medium  Right Nipple: Everted, Intact  Breast- Feeding Assessment  Attends Breast-Feeding Classes: No  Breast-Feeding Experience: Yes(yes, with previous 5 children; several months)  Breast Trauma/Surgery: No  Type/Quality: Good  Lactation Consultant Visits  Breast-Feedings: Good   Mother/Infant Observation  Mother Observation: Alignment, Breast comfortable, Close hold, Lets baby end feeding(nipple pinched on release)  Infant Observation: Breast tissue moves, Feeding cues, Latches nipple and aereolae, Lips flanged, lower, Lips flanged, upper, Opens mouth  LATCH Documentation  Latch: Grasps breast, tongue down, lips flanged, rhythmic sucking  Audible Swallowing: A few with stimulation  Type of Nipple: Everted (after stimulation)  Comfort (Breast/Nipple): Soft/non-tender  Hold (Positioning): No assist from staff, mother able to position/hold infant  LATCH Score: 5     Mother  previous infants for several months. \"I usually stop because I have postpartum depression treated by medication and I can't breastfeed while taking medication. \"  Mother's concerns supported and education performed regarding safe medication use for PPD while breastfeeding. OB on call informed of patient's concerns. Mom will follow up with primary OB.

## 2021-03-28 NOTE — H&P
Labor and Delivery Admission Note  3/28/2021    28 y.o., , female, G5  P 4 Estimated Date of Delivery: 3/23/21 by dates and US presents with contractions/comletely dilated at 2130  Reports good fetal movement, no bleeding, and has mild contractions.      PNC: Blood type: O            RH: pos            Rubella: immune                        GBS status: negative  Past Medical History:   Diagnosis Date    Anxiety and depression     taking medication prior to pregnancy    Gestational diabetes     Prior 4 pregnancies, stated not with current    Postpartum depression ,     both previous babies, was treated with medication both times     Past Surgical History:   Procedure Laterality Date    HX CHOLECYSTECTOMY  2020    HX OTHER SURGICAL      Cholecystectomy     OB/GYN: 4   Meds:   Current Facility-Administered Medications   Medication Dose Route Frequency    ibuprofen (MOTRIN) tablet 800 mg  800 mg Oral Q8H    oxyCODONE-acetaminophen (PERCOCET) 5-325 mg per tablet 1 Tab  1 Tab Oral Q4H PRN    HYDROmorphone (DILAUDID) injection 1 mg  1 mg IntraVENous Q4H PRN    naloxone (NARCAN) injection 0.4 mg  0.4 mg IntraVENous PRN    oxytocin (PITOCIN) 10 unit bolus from bag  10 Units IntraVENous PRN    And    oxytocin (PITOCIN) 30 units/500 ml LR  87.3 rosa-units/min IntraVENous PRN    ondansetron (ZOFRAN ODT) tablet 4 mg  4 mg Oral ONCE PRN    simethicone (MYLICON) tablet 80 mg  80 mg Oral QID PRN    docusate sodium (COLACE) capsule 100 mg  100 mg Oral DAILY PRN    diphenhydrAMINE (BENADRYL) injection 12.5 mg  12.5 mg IntraVENous Q4H PRN    measles, mumps & rubella Vacc (PF) (M-M-R II) injection 0.5 mL  0.5 mL SubCUTAneous PRIOR TO DISCHARGE    rho D immune globulin (RHOGAM) 1,500 unit (300 mcg) injection 0.3 mg  300 mcg IntraMUSCular ONCE PRN    zolpidem (AMBIEN) tablet 5 mg  5 mg Oral QHS PRN    witch hazel-glycerin (TUCKS) 12.5-50 % pads 1 Pad  1 Pad PeriANAL PRN    lidocaine (XYLOCAINE) 10 mg/mL (1 %) injection         Allergies: No Known Allergies  Pertinent ROS: otherwise negatvie   Family History   Problem Relation Age of Onset    Hypertension Mother     Hypertension Maternal Grandmother      Social History     Socioeconomic History    Marital status:      Spouse name: Not on file    Number of children: Not on file    Years of education: Not on file    Highest education level: Not on file   Occupational History    Not on file   Social Needs    Financial resource strain: Not on file    Food insecurity     Worry: Not on file     Inability: Not on file    Transportation needs     Medical: Not on file     Non-medical: Not on file   Tobacco Use    Smoking status: Never Smoker    Smokeless tobacco: Never Used   Substance and Sexual Activity    Alcohol use: No    Drug use: No    Sexual activity: Yes     Partners: Male     Birth control/protection: None   Lifestyle    Physical activity     Days per week: Not on file     Minutes per session: Not on file    Stress: Not on file   Relationships    Social connections     Talks on phone: Not on file     Gets together: Not on file     Attends Bahai service: Not on file     Active member of club or organization: Not on file     Attends meetings of clubs or organizations: Not on file     Relationship status: Not on file    Intimate partner violence     Fear of current or ex partner: Not on file     Emotionally abused: Not on file     Physically abused: Not on file     Forced sexual activity: Not on file   Other Topics Concern     Service Not Asked    Blood Transfusions Not Asked    Caffeine Concern Not Asked    Occupational Exposure Not Asked   Veto Risk Hazards Not Asked    Sleep Concern Not Asked    Stress Concern Not Asked    Weight Concern Not Asked    Special Diet Not Asked    Back Care Not Asked    Exercise Not Asked    Bike Helmet Not Asked    Seat Belt Not Asked    Self-Exams Not Asked   Social History Narrative    Not on file       OBJECTIVE:  Gravid , female NAD  Temp (24hrs), Av °F (36.7 °C), Min:97.9 °F (36.6 °C), Max:98.1 °F (36.7 °C)    Visit Vitals  BP (!) 104/56 (BP 1 Location: Left arm, BP Patient Position: At rest)   Pulse 81   Temp 97.9 °F (36.6 °C)   Resp 16   SpO2 96%       Labs:    Lab Results   Component Value Date/Time    WBC 5.6 2019 07:48 AM       Exam:  HEENT:  normal   Lungs:  clear  Cor:  RRR  Abdomen:  Fundal height apporpriate                    Soft between UC                    Clinical EFW  Fetal heart rate tracing:  reactive  Contraction pattern: regular  Cervix:  Completely dilated  Fluid:  clear  Pelvimetry:  AP-good                      Arch- adequate                      Sidewalls- adequate                      Pelvis feels adequate for fetus. Impression:  IUP at 40 weeks.     Plan: Anticipate            Epidural as desired     Isa Welch MD

## 2021-03-28 NOTE — L&D DELIVERY NOTE
Delivery Summary    Patient: Gabriel Ocampo MRN: [id number]  SSN: [id number]    YOB: 1990  Age: 28 y.o. Sex: female       Information for the patient's :  [facility] Del [[id number]]       Labor Events:    Labor: No    Steroids: None   Cervical Ripening Date/Time:       Cervical Ripening Type: None   Antibiotics During Labor: No   Rupture Identifier: Sac 1    Rupture Date/Time: 3/27/2021 9:56 PM   Rupture Type: SROM   Amniotic Fluid Volume: None    Amniotic Fluid Description: Meconium    Amniotic Fluid Odor: None    Induction: None       Induction Date/Time:        Indications for Induction:      Augmentation: None   Augmentation Date/Time:      Indications for Augmentation:     Labor complications: None       Additional complications:        Delivery Events:  Indications For Episiotomy:     Episiotomy: None   Perineal Laceration(s): 2nd   Repaired:     Periurethral Laceration Location:      Repaired:     Labial Laceration Location:     Repaired:     Sulcal Laceration Location:     Repaired:     Vaginal Laceration Location:     Repaired:     Cervical Laceration Location:     Repaired:     Repair Suture: Vicryl 2-0   Number of Repair Packets: 2   Estimated Blood Loss (ml):  ml   Quantitative Blood Loss (ml)                Delivery Date: 3/27/2021    Delivery Time: 10:00 PM  Delivery Type:    Sex:  Male    Gestational Age: 36w2d   Delivery Clinician:  Zan Hadley  Living Status: Living   Delivery Location: L&D Room 209          APGARS  One minute Five minutes Ten minutes   Skin color: 1   1        Heart rate: 2   2        Grimace: 2   2        Muscle tone: 2   2        Breathin   2        Totals: 9   9            Presentation: Vertex    Position: Left Occiput Anterior  Resuscitation Method:        Meconium Stained:        Cord Information: 3 Vessels  Complications: None  Cord around:    Delayed cord clamping?  Yes  Cord clamped date/time:3/27/2021 10:03 PM  Disposition of Cord Blood: Blood Gases Sent?: No    Placenta:  Date/Time: 3/27/2021 10:05 PM  Removal: Spontaneous      Appearance: Normal     [place] Measurements:  Birth Weight:        Birth Length:        Head Circumference:        Chest Circumference:       Abdominal Girth: Other Providers:   Emily ANGLIN;JUVENAL BARNETT;VIOLET PARKER;;MARIA ANTONIA JOVEL, Obstetrician;Primary Nurse;Primary  Nurse;Scrub Tech;Charge Nurse           Group B Strep:   Lab Results   Component Value Date/Time    GrBStrep, External NEGATIVE 2018     Information for the patient's :  Toro Knight [[id number]]   No results found for: ABORH, PCTABR, PCTDIG, BILI, ABORHEXT, ABORH     No results for input(s): PCO2CB, PO2CB, HCO3I, SO2I, IBD, PTEMPI, SPECTI, PHICB, ISITE, IDEV, IALLEN in the last 72 hours. bstetrician:  Ermelinda Elliott MD    Assistant: none    Pre-Delivery Diagnosis: Term pregnancy and Spontaneous labor    Post-Delivery Diagnosis: Living  infant(s)    Intrapartum Event: None    Procedure: Spontaneous vaginal delivery    Epidural: NO    Monitor:  Fetal Heart Tones - External    Indications for instrumental delivery: none    Estimated Blood Loss:     Episiotomy: none    Laceration(s):  2nd degree    Laceration(s) repair: YES    Presentation: Cephalic    Fetal Description: antonio    Fetal Position: Occiput Anterior    Birth Weight: pending      Apgar - One Minute: 9    Apgar - Five Minutes: 9    Umbilical Cord: normal    Specimens: none           Complications:  {del [id number]    Procedure:  Pt progressed to completely dilated. Baby delivered vertex, shoulders delivered atraumatically, remainder of the infant delivered. Cervix inspected and intact. Repair done with 2-0 vicryl* suture. Pt tolerated procedure well.            Cord Blood Results:   Information for the patient's :  Toro Knight [[id number]]   No results found for: PCTABR, BILI, ABORH     Prenatal Labs:     Lab Results   Component Value Date/Time    ABO,Rh O positive 10/03/2017    HBsAg, External negative 10/03/2017    HIV, External negative 10/03/2017    Rubella, External immune 10/03/2017    RPR, External non reactive 10/03/2017    Gonorrhea, External negative 10/03/2017    Chlamydia, External negative 10/03/2017    GrBStrep, External NEGATIVE 03/01/2018        Attending Attestation: I was present and scrubbed for the entire procedure    Signed By:  Shobha Montenegro MD     March 27, 2021

## 2021-03-28 NOTE — ROUTINE PROCESS
Bedside and Verbal shift change report given to JOHN Rodas RN (oncoming nurse) by Hayley Peng RN (offgoing nurse). Report included the following information SBAR, Kardex, Intake/Output, MAR and Accordion.

## 2021-03-29 VITALS
DIASTOLIC BLOOD PRESSURE: 53 MMHG | OXYGEN SATURATION: 96 % | HEART RATE: 76 BPM | TEMPERATURE: 98.5 F | SYSTOLIC BLOOD PRESSURE: 100 MMHG | RESPIRATION RATE: 18 BRPM

## 2021-03-29 PROCEDURE — 77030021125

## 2021-03-29 PROCEDURE — 74011250637 HC RX REV CODE- 250/637: Performed by: OBSTETRICS & GYNECOLOGY

## 2021-03-29 RX ADMIN — DOCUSATE SODIUM 100 MG: 100 CAPSULE, LIQUID FILLED ORAL at 07:25

## 2021-03-29 RX ADMIN — IBUPROFEN 800 MG: 800 TABLET, FILM COATED ORAL at 07:25

## 2021-03-29 NOTE — DISCHARGE INSTRUCTIONS
Discharge Instructions for Vaginal Delivery    Patient ID:  Jose D Castellanos  [id number]  28 y.o.  1990    Take Home Medications           Continue taking your prenatal vitamins if you are breastfeeding. Follow-up Appointment:  Follow-up with [facility] in 6 weeks. Follow-up care is a key part of your treatment and safety. Be sure to make and go to all appointments, and call your doctor if you are having problems. Its also a good idea to know your test results and keep a list of the medicines you take. Activity  Avoid anything in your vagina for 6 weeks (no intercourse, tampons, or douching). You may drive unless you are taking prescription pain medications. Climbing stairs and light lifting are ok after a vaginal delivery unless your doctor tells you not to. You may gradually work up to exercise over the next few weeks, but take it easy- you'll be tired! Diet  You may eat a regular diet but you may want to avoid heavy, greasy foods and other foods that could increase constipation. Wound care  If you have stitches, continue to rinse with a squirt bottle of warm water each time you use the bathroom for about 2 weeks. Your stitches will gradually dissolve over several weeks. Sitz baths are also helpful to keep the wound clean, encourage healing, and to help with pain associated with the stitches or hemorrhoids. You can use either a sitz bath basin or a bathtub filled with 2-3\" inches of plain warm water. Soak for 10 minutes 3 times a day. Pain Management  If you were not given a prescription pain medication, you can take over the counter pain medicines like Tylenol (acetominophen), Advil or Motrin (ibuprofen). You can take acetominophen and ibuprofen together, alternating doses every few hours.   You will get the most relief if you take the maximum dose:  · Tylenol or acetominophen 1000 mg every 6 hours (equivalent to 2 Extra Strength Tylenols every 6 hours)  · Motrin or Advil (generic ibuprofen) 800 mg every 8 hours (4 tablets or capsules every 8 hours)  If you were given a prescription pain medication, you can take ibuprofen along with it (doses as above), but not Tylenol. Use ibuprofen as the main medication, and take the prescription medication if needed for more severe pain not relieved by the ibuprofen. Your goal should be to take only the minimum necessary amounts of the prescription medication (narcotic), as these pain medicines can be habit-forming and will worsen or cause constipation. Most patients will find that within a couple of days, their pain is adequately controlled using only over-the-counter medications. A heating pad can also be very helpful for cramping or back pain. Over-the-counter hemorrhoid wipes and ointments are fine to use if you have hemorrhoids. Constipation  You may find that bowel movements are irregular after delivery and that you have a tendency to be constipated. If you have stitches (and especially if you had a more severe tear called a third- or fourth-degree), your bowel movements will be more comfortable if they are soft. A stool softener such as Colace (docusate) can safely be taken daily if needed. If you become constipated you can use a laxative such as Dulcolax, Miralax or Milk of Magnesia. Don't wait until constipation is severe- take something sooner rather than later and you will feel much better! Your Recovery: What to Expect at Home  Delivering a baby is hard work and you probably aren't getting much sleep, so you will certainly be tired. Try to rest when you can and don't worry about doing housework or other tasks which can wait. If you're experiencing soreness or swelling in your bottom, this should improve over the next few days to 2 weeks. You are likely to have some back pain or general body aches or muscle soreness. This should improve with acetominophen or ibuprofen.   If your legs were swollen during your pregnancy or as a result of IV fluids given during your hospital stay, this should go away in a few days to a week. Most women experience some form of the \"Baby Blues\" after having a baby. This means that you may feel emotional, tearful, frustrated, anxious, sad, and irritable some of the time. This is normal if it's not severe and should go away after about 2 weeks. Getting as much rest as you can will help. Accept assistance from friends and family members so that you can take breaks from caring for the baby. Call your doctor if your symptoms seem severe, last more than 2 weeks, or seem to be getting worse instead of better. Get help immediately if you have thoughts of wanting to hurt yourself or others! When should you call for help? Call 911 anytime you think you may need emergency care. For example, call if:  You pass out (lose consciousness). You have sudden chest pain and shortness of breath, or you cough up blood. You have severe pain in your belly. Call your doctor now or seek immediate medical care if:  You have heavy vaginal bleeding that soaks one or more pads in an hour, or you have large clots (golf ball size or larger). Your have foul-smelling discharge from your vagina. You are sick to your stomach or cannot keep fluids down. You have pain that does not get better after you take pain medicine. You have signs of infection, such as: Increased pain in your abdomen or vaginal area  Red streaks, warmth, or tenderness of your breasts. A fever of 101 or greater (taken by mouth). You have signs of a blood clot, such as:  Pain in your calf, back of knee, thigh, or groin. Redness and swelling in your leg or groin. You have trouble passing urine or stool, especially if you have pain or swelling in your lower belly; or if you are unable to have a bowel movement after taking a laxative. You have a fast or pounding heartbeat.     Patient Education        After Your Delivery (the Postpartum Period): Care Instructions  Your Care Instructions     Congratulations on the birth of your baby. Like pregnancy, the  period can be a time of excitement, tadeo, and exhaustion. You may look at your wondrous little baby and feel happy. You may also be overwhelmed by your new sleep hours and new responsibilities. At first, babies often sleep during the days and are awake at night. They do not have a pattern or routine. They may make sudden gasps, jerk themselves awake, or look like they have crossed eyes. These are all normal, and they may even make you smile. In these first weeks after delivery, try to take good care of yourself. It may take 4 to 6 weeks to feel like yourself again, and possibly longer if you had a  birth. You will likely feel very tired for several weeks. Your days will be full of ups and downs, but lots of tadeo as well. Follow-up care is a key part of your treatment and safety. Be sure to make and go to all appointments, and call your doctor if you are having problems. It's also a good idea to know your test results and keep a list of the medicines you take. How can you care for yourself at home? Take care of your body after delivery  · Use pads instead of tampons for the bloody flow that may last as long as 2 weeks. · Ease cramps with ibuprofen (Advil, Motrin). · Ease soreness of hemorrhoids and the area between your vagina and rectum with ice compresses or witch hazel pads. · Ease constipation by drinking lots of fluid and eating high-fiber foods. Ask your doctor about over-the-counter stool softeners. · Cleanse yourself with a gentle squeeze of warm water from a bottle instead of wiping with toilet paper. · Take a sitz bath in warm water several times a day. · Wear a good nursing bra. Ease sore and swollen breasts with warm, wet washcloths. · If you are not breastfeeding, use ice rather than heat for breast soreness.   · Your period may not start for several months if you are breastfeeding. You may bleed more, and longer at first, than you did before you got pregnant. · Wait until you are healed (about 4 to 6 weeks) before you have sexual intercourse. Your doctor will tell you when it is okay to have sex. · Try not to travel with your baby for 5 or 6 weeks. If you take a long car trip, make frequent stops to walk around and stretch. Avoid exhaustion  · Rest every day. Try to nap when your baby naps. · Ask another adult to be with you for a few days after delivery. · Plan for  if you have other children. · Stay flexible so you can eat at odd hours and sleep when you need to. Both you and your baby are making new schedules. · Plan small trips to get out of the house. Change can make you feel less tired. · Ask for help with housework, cooking, and shopping. Remind yourself that your job is to care for your baby. Know about help for postpartum depression  · \"Baby blues\" are common for the first 1 to 2 weeks after birth. You may cry or feel sad or irritable for no reason. · Rest whenever you can. Being tired makes it harder to handle your emotions. · Go for walks with your baby. · Talk to your partner, friends, and family about your feelings. · If your symptoms last for more than a few weeks, or if you feel very depressed, ask your doctor for help. · Postpartum depression can be treated. Support groups and counseling can help. Sometimes medicine can also help. Stay healthy  · Eat healthy foods so you have more energy and lose extra baby pounds. · If you breastfeed, avoid drugs. If you quit smoking during pregnancy, try to stay smoke-free. If you choose to have a drink now and then, have only one drink, and limit the number of occasions that you have a drink. Wait to breastfeed at least 2 hours after you have a drink to reduce the amount of alcohol the baby may get in the milk. · Start daily exercise after 4 to 6 weeks, but rest when you feel tired.   · Learn exercises to tone your belly. Do Kegel exercises to regain strength in your pelvic muscles. You can do these exercises while you stand or sit. ? Squeeze the same muscles you would use to stop your urine. Your belly and thighs should not move. ? Hold the squeeze for 3 seconds, and then relax for 3 seconds. ? Start with 3 seconds. Then add 1 second each week until you are able to squeeze for 10 seconds. ? Repeat the exercise 10 to 15 times for each session. Do three or more sessions each day. · Find a class for new mothers and new babies that has an exercise time. · If you had a  birth, give yourself a bit more time before you exercise, and be careful. When should you call for help? Call  911 anytime you think you may need emergency care. For example, call if:    · You have thoughts of harming yourself, your baby, or another person.     · You passed out (lost consciousness).     · You have chest pain, are short of breath, or cough up blood.     · You have a seizure. Call your doctor now or seek immediate medical care if:    · You have severe vaginal bleeding. This means you are passing blood clots and soaking through a pad each hour for 2 or more hours.     · You are dizzy or lightheaded, or you feel like you may faint.     · You have a fever.     · You have new or more belly pain.     · You have signs of a blood clot in your leg (called a deep vein thrombosis), such as:  ? Pain in the calf, back of the knee, thigh, or groin. ? Redness and swelling in your leg or groin.     · You have signs of preeclampsia, such as:  ? Sudden swelling of your face, hands, or feet. ? New vision problems (such as dimness, blurring, or seeing spots). ? A severe headache.    Watch closely for changes in your health, and be sure to contact your doctor if:    · Your vaginal bleeding seems to be getting heavier.     · You have new or worse vaginal discharge.     · You feel sad, anxious, or hopeless for more than a few days.     · You do not get better as expected. Where can you learn more? Go to http://www.[employer].com/  Enter A461 in the search box to learn more about \"After Your Delivery (the Postpartum Period): Care Instructions. \"  Current as of: February 11, 2020               Content Version: 12.6  © 2219-4045 [employer]. Care instructions adapted under license by [employer] (which disclaims liability or warranty for this information). If you have questions about a medical condition or this instruction, always ask your healthcare professional. [address] any warranty or liability for your use of this information.

## 2021-03-29 NOTE — PROGRESS NOTES
Post-Partum Day Number 2 Progress Note    Patient doing well post-partum without significant complaints. Voiding without difficulty, normal lochia. Vitals:    Patient Vitals for the past 24 hrs:   BP Temp Pulse Resp   21 0759 (!) 100/53 98.5 °F (36.9 °C) 76 18   21 2303 (!) 95/54 98.2 °F (36.8 °C) 75 16   21 1537 111/69 98.5 °F (36.9 °C) 81 16     Temp (24hrs), Av.4 °F (36.9 °C), Min:98.2 °F (36.8 °C), Max:98.5 °F (36.9 °C)      Vital signs stable, afebrile. Exam:     Feeding: breast    Patient without distress. Abdomen soft, fundus firm, nontender               Lower extremities- nontender without edema; no cords    Labs:   Recent Results (from the past 24 hour(s))   GLUCOSE, POC    Collection Time: 21 11:22 AM   Result Value Ref Range    Glucose (POC) 118 (H) 65 - 100 mg/dL    Performed by Marques Coker (PCT)        Assessment and Plan:  Patient appears to be having uncomplicated post-partum course. Discharge today-instructions reviewed. O positive/ declined meds. F/u with endocrine as outpatient due to GDM during pregnancy.

## 2021-03-29 NOTE — ROUTINE PROCESS
Bedside and Verbal shift change report given to Santosh Kramer RN (oncoming nurse) by Terry Pagan RN (offgoing nurse). Report included the following information SBAR, Kardex, Intake/Output, MAR and Accordion.

## 2021-03-29 NOTE — LACTATION NOTE
This note was copied from a baby's chart. Pt chooses to do both breast and bottle. Discussed effects of early supplementation on breastfeeding success; may decrease breastmilk production and supply, increase risk for pathological engorgement, baby may develop preference for faster flow from bottles vs breast, and baby's stomach can be stretched if larger volumes of formula are given. Reviewed breastfeeding basics:  How milk is made and normal  breastfeeding behaviors discussed. Supply and demand,  stomach size, early feeding cues, skin to skin bonding with comfortable positioning and baby led latch-on reviewed. How to identify signs of successful breastfeeding sessions reviewed; education on assymetrical latch, signs of effective latching vs shallow, in-effective latching, normal  feeding frequency and duration and expected infant output discussed. Normal course of breastfeeding discussed including the AAP's recommendation that children receive exclusive breast milk feedings for the first six months of life with breast milk feedings to continue through the first year of life and/or beyond as complimentary table foods are added. Breastfeeding Booklet and Warm line information provided with discussion. Discussed typical  weight loss and the importance of pediatrician appointment within 24-48 hours of discharge, at 2 weeks of life and normalcy of requesting pediatric weight checks as needed in between visits. Hand Expression Education:  Mom taught how to manually hand express her colostrum. Emphasized the importance of providing infant with valuable colostrum as infant rests skin to skin at breast.  Aware to avoid extended periods of non-feeding. Aware to offer 10-20+ drops of colostrum every 2-3 hours until infant is latching and nursing effectively. Taught the rationale behind this low tech but highly effective evidence based practice. Pt will successfully establish breastfeeding by feeding in response to early feeding cues   or wake every 3h, will obtain deep latch, and will keep log of feedings/output. Taught to BF at hunger cues and or q 2-3 hrs and to offer 10-20 drops of hand expressed colostrum at any non-feeds. Breast Assessment  Left Breast: Medium  Left Nipple: Everted, Intact  Right Breast: Medium  Right Nipple: Everted, Intact  Breast- Feeding Assessment  Attends Breast-Feeding Classes: No  Breast-Feeding Experience: Yes(blend fed all chidlren, this is 5th child)  Breast Trauma/Surgery: No  Type/Quality: Good  Lactation Consultant Visits  Breast-Feedings: Good   Mother/Infant Observation  Mother Observation: (baby not at breast during [facility] visit)  Infant Observation: Breast tissue moves, Feeding cues, Latches nipple and aereolae, Lips flanged, lower, Lips flanged, upper, Opens mouth  LATCH Documentation  Latch: Grasps breast, tongue down, lips flanged, rhythmic sucking  Audible Swallowing: A few with stimulation  Type of Nipple: Everted (after stimulation)  Comfort (Breast/Nipple): Soft/non-tender  Hold (Positioning): No assist from staff, mother able to position/hold infant  LATCH Score: 9  Chart shows numerous feedings, void, stool WNL. Discussed importance of monitoring outputs and feedings on first week of life. Discussed ways to tell if baby is  getting enough breast milk, ie  voids and stools, change in color of stool, and return to birth wt within 2 weeks. Follow up with pediatrician visit for weight check in 1-2 days (per AAP guidelines.)  Encouraged to call Warm Line  [phone]  for any questions/problems that arise. Mother also given breastfeeding support group dates and times for any future needs.

## 2021-03-29 NOTE — PROGRESS NOTES
Patient discharged to home with infant and significant other. Infant in carseat. Patient in wheelchair. No prescriptions given. Discharge instructions reviewed with patient and significant other, signed by patient, and copies given. Per patient and significant other, no questions. Patient and infant bands verified. See infant note and/or footprint sheet on chart.

## 2021-12-31 ENCOUNTER — HOSPITAL ENCOUNTER (EMERGENCY)
Age: 31
Discharge: HOME | End: 2021-12-31
Attending: EMERGENCY MEDICINE
Payer: COMMERCIAL

## 2021-12-31 VITALS
DIASTOLIC BLOOD PRESSURE: 75 MMHG | WEIGHT: 120 LBS | TEMPERATURE: 100.5 F | BODY MASS INDEX: 23.56 KG/M2 | RESPIRATION RATE: 18 BRPM | HEART RATE: 104 BPM | OXYGEN SATURATION: 100 % | SYSTOLIC BLOOD PRESSURE: 120 MMHG | HEIGHT: 60 IN

## 2021-12-31 DIAGNOSIS — B34.9 VIRAL ILLNESS: Primary | ICD-10-CM

## 2021-12-31 DIAGNOSIS — Z11.52 ENCOUNTER FOR SCREENING FOR COVID-19: ICD-10-CM

## 2021-12-31 LAB
APPEARANCE UR: ABNORMAL
BACTERIA URNS QL MICRO: NEGATIVE /HPF
BILIRUB UR QL: NEGATIVE
COLOR UR: ABNORMAL
DEPRECATED S PYO AG THROAT QL EIA: NEGATIVE
FLUAV AG NPH QL IA: NEGATIVE
FLUBV AG NOSE QL IA: NEGATIVE
GLUCOSE UR STRIP.AUTO-MCNC: NEGATIVE MG/DL
HCG UR QL: NEGATIVE
HGB UR QL STRIP: ABNORMAL
KETONES UR QL STRIP.AUTO: NEGATIVE MG/DL
LEUKOCYTE ESTERASE UR QL STRIP.AUTO: ABNORMAL
MUCOUS THREADS URNS QL MICRO: ABNORMAL /LPF
NITRITE UR QL STRIP.AUTO: NEGATIVE
PH UR STRIP: 5 [PH] (ref 5–8)
PROT UR STRIP-MCNC: NEGATIVE MG/DL
RBC #/AREA URNS HPF: ABNORMAL /HPF (ref 0–5)
SARS-COV-2, COV2: NORMAL
SP GR UR REFRACTOMETRY: 1.02 (ref 1–1.03)
UA: UC IF INDICATED,UAUC: ABNORMAL
UROBILINOGEN UR QL STRIP.AUTO: 0.1 EU/DL (ref 0.1–1)
WBC URNS QL MICRO: ABNORMAL /HPF (ref 0–4)

## 2021-12-31 PROCEDURE — 81025 URINE PREGNANCY TEST: CPT

## 2021-12-31 PROCEDURE — 87880 STREP A ASSAY W/OPTIC: CPT

## 2021-12-31 PROCEDURE — U0005 INFEC AGEN DETEC AMPLI PROBE: HCPCS

## 2021-12-31 PROCEDURE — 81001 URINALYSIS AUTO W/SCOPE: CPT

## 2021-12-31 PROCEDURE — 99283 EMERGENCY DEPT VISIT LOW MDM: CPT

## 2021-12-31 PROCEDURE — 87804 INFLUENZA ASSAY W/OPTIC: CPT

## 2021-12-31 RX ORDER — IBUPROFEN 600 MG/1
600 TABLET ORAL
Status: DISCONTINUED | OUTPATIENT
Start: 2021-12-31 | End: 2021-12-31 | Stop reason: HOSPADM

## 2021-12-31 NOTE — ED PROVIDER NOTES
EMERGENCY DEPARTMENT HISTORY AND PHYSICAL EXAM      Date: 12/31/2021  Patient Name: Richard Hilliard    History of Presenting Illness     Chief Complaint   Patient presents with    Generalized Body Aches       History Provided By: Patient    HPI: Richard Hilliard, 28 y.o. female with a past medical history significant No significant past medical history presents to the ED with cc of fever, joint pain, body aches,   Pt requesting rapid covid testing so she can go home to her children. There are no other complaints, changes, or physical findings at this time. PCP: Nimo De La Rosa MD    No current facility-administered medications on file prior to encounter. Current Outpatient Medications on File Prior to Encounter   Medication Sig Dispense Refill    glucose blood VI test strips (OneTouch Ultra Blue Test Strip) strip Test QID Dx Code: O24.410 200 Strip 11    Blood-Glucose Meter (OneTouch UltraMini) monitoring kit Test QID Dx Code: O24.410 1 Kit 0    lancets (OneTouch Delica Plus Lancet) 33 gauge misc Test QID Dx Code: O24.410 200 Lancet 11    MAGNESIUM PO Take 1 Tab by mouth daily.  CALCIUM PO Take 1 Tab by mouth daily.  cholecalciferol (VITAMIN D3) (5000 Units/125 mcg) tab tablet Take 2,500 Units by mouth daily.  oxyCODONE-acetaminophen (PERCOCET) 5-325 mg per tablet Take 1 Tab by mouth every four (4) hours as needed. Max Daily Amount: 6 Tabs. 10 Tab 0    [id number] lv-folic-dha (PRENATAL DHA+COMPLETE PRENATAL) -300 mg-mcg-mg cmpk Take  by mouth.  Indications: pregnancy         Past History     Past Medical History:  Past Medical History:   Diagnosis Date    Anxiety and depression     taking medication prior to pregnancy    Gestational diabetes     Prior 4 pregnancies, stated not with current    Postpartum depression 2015, 2016    both previous babies, was treated with medication both times       Past Surgical History:  Past Surgical History:   Procedure Laterality Date    HX CHOLECYSTECTOMY 01/2020    HX OTHER SURGICAL      Cholecystectomy       Family History:  Family History   Problem Relation Age of Onset    Hypertension Mother     Hypertension Maternal Grandmother        Social History:  Social History     Tobacco Use    Smoking status: Never Smoker    Smokeless tobacco: Never Used   Vaping Use    Vaping Use: Never used   Substance Use Topics    Alcohol use: No    Drug use: No       Allergies:  No Known Allergies      Review of Systems     Review of Systems   Constitutional: Positive for fatigue and fever. HENT: Positive for congestion, rhinorrhea and sinus pain. Respiratory: Positive for cough. Negative for shortness of breath and wheezing. Gastrointestinal: Negative for diarrhea, nausea and vomiting. Musculoskeletal: Positive for myalgias. Skin: Negative for color change and rash. All other systems reviewed and are negative. Physical Exam     Physical Exam  Constitutional:       Appearance: Normal appearance. She is normal weight. HENT:      Head: Normocephalic and atraumatic. Eyes:      Extraocular Movements: Extraocular movements intact. Pupils: Pupils are equal, round, and reactive to light. Cardiovascular:      Rate and Rhythm: Normal rate and regular rhythm. Pulses: Normal pulses. Heart sounds: Normal heart sounds. Pulmonary:      Effort: Pulmonary effort is normal.      Breath sounds: Normal breath sounds. Abdominal:      General: Abdomen is flat. Musculoskeletal:         General: Normal range of motion. Skin:     General: Skin is warm and dry. Capillary Refill: Capillary refill takes less than 2 seconds. Neurological:      General: No focal deficit present. Mental Status: She is alert and oriented to person, place, and time.    Psychiatric:         Mood and Affect: Mood normal.         Behavior: Behavior normal.         Lab and Diagnostic Study Results     Labs -     No results found for this or any previous visit (from the past 12 hour(s)). Radiologic Studies -   @lastxrresult@  CT Results  (Last 48 hours)    None        CXR Results  (Last 48 hours)    None            Medical Decision Making   - I am the first provider for this patient. - I reviewed the vital signs, available nursing notes, past medical history, past surgical history, family history and social history. - Initial assessment performed. The patients presenting problems have been discussed, and they are in agreement with the care plan formulated and outlined with them. I have encouraged them to ask questions as they arise throughout their visit. Vital Signs-Reviewed the patient's vital signs. No data found. Records Reviewed: Nursing Notes    The patient presents with fever with a differential diagnosis of viral syndrome, influenza. Covid, strep, Uti. ED Course:          Provider Notes (Medical Decision Making):     MDM  Number of Diagnoses or Management Options  Encounter for screening for COVID-19: new, needed workup  Viral illness: new, needed workup     Amount and/or Complexity of Data Reviewed  Clinical lab tests: ordered and reviewed    Risk of Complications, Morbidity, and/or Mortality  Presenting problems: low  Diagnostic procedures: low  Management options: low  General comments: Pt educated to rapid testing availability otc at pharmacy. Pt educated ER was not appropriate means for covid testing. Pt and discussed covid and isolation as she does have sx, she would have to isolate until testing returned. Pt notified of result for flu and strep. Pt encouraged to take mvi with immune support. Tylenol motrin for pain or fevers over. 101.5. ASA 81mg daily. Mucinex /mucinex dm for cough/congestion  Rest.  CDC guidelines for quarantine reiterated. Pepcid 20mg daily to help with inflammation. Hydration. =gatorade, pedialyte and water.      Patient Progress  Patient progress: stable         Procedures   Medical Decision Makingedical Decision Making  Performed by: Liliana Cifuentes NP  PROCEDURES: na  Procedures       Disposition   Disposition: Condition improved  DC- Adult Discharges: All of the diagnostic tests were reviewed and questions answered. Diagnosis, care plan and treatment options were discussed. The patient understands the instructions and will follow up as directed. The patients results have been reviewed with them. They have been counseled regarding their diagnosis. The patient verbally convey understanding and agreement of the signs, symptoms, diagnosis, treatment and prognosis and additionally agrees to follow up as recommended with their PCP in 24 - 48 hours. They also agree with the care-plan and convey that all of their questions have been answered. I have also put together some discharge instructions for them that include: 1) educational information regarding their diagnosis, 2) how to care for their diagnosis at home, as well a 3) list of reasons why they would want to return to the ED prior to their follow-up appointment, should their condition change. DC-The patient was given verbal fever treatment  and follow-up instructions        DISCHARGE PLAN:  1.    Current Discharge Medication List      CONTINUE these medications which have NOT CHANGED    Details   glucose blood VI test strips (OneTouch Ultra Blue Test Strip) strip Test QID Dx Code: O24.410  Qty: 200 Strip, Refills: 11    Associated Diagnoses: Diet controlled gestational diabetes mellitus (GDM) in third trimester      Blood-Glucose Meter (OneTouch UltraMini) monitoring kit Test QID Dx Code: O24.410  Qty: 1 Kit, Refills: 0    Associated Diagnoses: Diet controlled gestational diabetes mellitus (GDM) in third trimester      lancets (OneTouch Delica Plus Lancet) 33 gauge misc Test QID Dx Code: O24.410  Qty: 200 Lancet, Refills: 11    Associated Diagnoses: Diet controlled gestational diabetes mellitus (GDM) in third trimester      MAGNESIUM PO Take 1 Tab by mouth daily.      CALCIUM PO Take 1 Tab by mouth daily. cholecalciferol (VITAMIN D3) (5000 Units/125 mcg) tab tablet Take 2,500 Units by mouth daily. oxyCODONE-acetaminophen (PERCOCET) 5-325 mg per tablet Take 1 Tab by mouth every four (4) hours as needed. Max Daily Amount: 6 Tabs. Qty: 10 Tab, Refills: 0    Associated Diagnoses: Pregnancy, unspecified gestational age      [id number] lv-folic-dha (PRENATAL DHA+COMPLETE PRENATAL) -861 mg-mcg-mg cmpk Take  by mouth. Indications: pregnancy           2. Follow-up Information     Follow up With Specialties Details Why Contact Info    Stephanie Gomez MD Internal Medicine In 2 days  [facility]  [phone]          3. Return to ED if worse   4. Discharge Medication List as of 12/31/2021 12:44 PM            Diagnosis     Clinical Impression:   1. Viral illness    2. Encounter for screening for COVID-19        Attestations:    Issac Christy NP    Please note that this dictation was completed with [employer], the [employer] voice recognition software. Quite often unanticipated grammatical, syntax, homophones, and other interpretive errors are inadvertently transcribed by the computer software. Please disregard these errors. Please excuse any errors that have escaped final proofreading. Thank you.

## 2021-12-31 NOTE — Clinical Note
[facility] [facility] EMERGENCY DEPT  [facility] [place] [address]  [phone]    Work/School Note    Date: 12/31/2021    To Whom It May concern:    John Hernandes was seen and treated today in the emergency room by the following provider(s):  Nurse Practitioner: Cliff Yousif NP. John Hernandes is excused from work/school on 12/31/21 and 01/01/22. She is medically clear to return to work/school on 1/2/2022.        Sincerely,          Jimbo Grubbs NP

## 2022-01-02 LAB
SARS-COV-2, XPLCVT: DETECTED
SOURCE, COVRS: ABNORMAL

## 2022-03-18 PROBLEM — O24.410 DIET CONTROLLED GESTATIONAL DIABETES MELLITUS (GDM), ANTEPARTUM: Status: ACTIVE | Noted: 2018-02-20

## 2022-03-18 PROBLEM — F41.9 ANXIETY: Status: ACTIVE | Noted: 2019-07-30

## 2022-03-19 PROBLEM — Z86.32 HISTORY OF GESTATIONAL DIABETES: Status: ACTIVE | Noted: 2017-05-16

## 2022-03-20 PROBLEM — K80.20 SYMPTOMATIC CHOLELITHIASIS: Status: ACTIVE | Noted: 2019-07-30

## 2022-03-20 PROBLEM — F32.A DEPRESSION: Status: ACTIVE | Noted: 2019-07-30

## 2022-04-21 ENCOUNTER — TELEPHONE (OUTPATIENT)
Dept: ENDOCRINOLOGY | Age: 32
End: 2022-04-21

## 2022-04-21 DIAGNOSIS — O24.410 DIET CONTROLLED GESTATIONAL DIABETES MELLITUS (GDM), ANTEPARTUM: Primary | ICD-10-CM

## 2022-04-21 NOTE — TELEPHONE ENCOUNTER
Patient called stating Dr. Saeed Briggs told her to come in to be checked for DM in 1 year.   She is not sure this is correct, please advise

## 2022-05-04 ENCOUNTER — OFFICE VISIT (OUTPATIENT)
Dept: ENDOCRINOLOGY | Age: 32
End: 2022-05-04
Payer: COMMERCIAL

## 2022-05-04 VITALS
SYSTOLIC BLOOD PRESSURE: 114 MMHG | HEART RATE: 76 BPM | OXYGEN SATURATION: 100 % | RESPIRATION RATE: 14 BRPM | HEIGHT: 60 IN | DIASTOLIC BLOOD PRESSURE: 71 MMHG | TEMPERATURE: 97.9 F | WEIGHT: 132 LBS | BODY MASS INDEX: 25.91 KG/M2

## 2022-05-04 DIAGNOSIS — Z86.32 HISTORY OF GESTATIONAL DIABETES: Primary | ICD-10-CM

## 2022-05-04 DIAGNOSIS — R63.5 WEIGHT GAIN: ICD-10-CM

## 2022-05-04 PROCEDURE — 99213 OFFICE O/P EST LOW 20 MIN: CPT | Performed by: INTERNAL MEDICINE

## 2022-05-04 NOTE — LETTER
5/4/2022    Patient: Ev Rm   YOB: 1990   Date of Visit: Judith Mckenzie MD  [facility] [address]  Via Fax: [phone]    Dear Errol Kimbrough MD,      Thank you for referring Ms. Disha Beltran to [id number] [place] [address] for evaluation. My notes for this consultation are attached. If you have questions, please do not hesitate to call me. I look forward to following your patient along with you.       Sincerely,    Phillip Haynes MD

## 2022-05-04 NOTE — PROGRESS NOTES
Derick Weiss MD      Patient Information  Name : Feli Lane 33 y.o.   YOB: 1990         Referred by:       History of Present Illness: Feli Lane is a 33 y.o. female  here for follow-up     She had gestational diabetes with all her pregnancies except the fourth  Diet controlled  Delivered a healthy boy in March 2021  Gained weight ,usual weight is 110 lbs     She had nutrition visit in the past      Past Medical History:   Diagnosis Date    Anxiety and depression     taking medication prior to pregnancy    Gestational diabetes     Prior 4 pregnancies, stated not with current    Postpartum depression 2015, 2016    both previous babies, was treated with medication both times       No Known Allergies    Current Outpatient Medications   Medication Sig Dispense Refill    cholecalciferol (VITAMIN D3) (5000 Units/125 mcg) tab tablet Take 2,500 Units by mouth daily.  glucose blood VI test strips (OneTouch Ultra Blue Test Strip) strip Test QID Dx Code: O24.410 (Patient not taking: Reported on 5/4/2022) 200 Strip 11    Blood-Glucose Meter (OneTouch UltraMini) monitoring kit Test QID Dx Code: O24.410 (Patient not taking: Reported on 5/4/2022) 1 Kit 0    lancets (OneTouch Delica Plus Lancet) 33 gauge misc Test QID Dx Code: O24.410 (Patient not taking: Reported on 5/4/2022) 200 Lancet 11    MAGNESIUM PO Take 1 Tab by mouth daily. (Patient not taking: Reported on 5/4/2022)      CALCIUM PO Take 1 Tab by mouth daily. (Patient not taking: Reported on 5/4/2022)      oxyCODONE-acetaminophen (PERCOCET) 5-325 mg per tablet Take 1 Tab by mouth every four (4) hours as needed. Max Daily Amount: 6 Tabs. (Patient not taking: Reported on 5/4/2022) 10 Tab 0    [id number] lv-folic-dha (PRENATAL DHA+COMPLETE PRENATAL) -300 mg-mcg-mg cmpk Take  by mouth.  Indications: pregnancy (Patient not taking: Reported on 5/4/2022)           Reviewed Family Hx,Social Hx  Review of Systems:  - Per  HPI    Physical Examination:  Visit Vitals  /71 (BP 1 Location: Right arm, BP Patient Position: Sitting, BP Cuff Size: Adult)   Pulse 76   Temp 97.9 °F (36.6 °C) (Temporal)   Resp 14   Ht 5' (1.524 m)   Wt 132 lb (59.9 kg)   SpO2 100%   BMI 25.78 kg/m²     - General: pleasant, no distress, good eye contact  - HEENT: no exopthalmos, no periorbital edema, EOMI, no lid lag or stare  - Neck: supple, no thyromegaly  - Cardiovascular: regular, normal rate, normal S1 and S2,  - Respiratory: clear to auscultation bilaterally  - Musculoskeletal: no proximal muscle weakness in upper or lower extremities  - Integumentary: no edema,   - Neurological:alert and oriented  - Psychiatric: normal mood and affect    Data Reviewed:       Assessment/Plan:     History of Gestational diabetes:    Lab Results   Component Value Date/Time    Hemoglobin A1c 5.5 04/27/2022 11:37 AM    Hemoglobin A1c (POC) 5.1 01/05/2021 11:15 AM     Weight gain - discussed about diet           Thank you for allowing me to participate in the care of this patient.     Candelario Roper MD      Patient verbalized understanding

## 2022-05-04 NOTE — PROGRESS NOTES
Es Antonio is a 33 y.o. female here for   Chief Complaint   Patient presents with    Diabetes     check       1. Have you been to the ER, urgent care clinic since your last visit? Hospitalized since your last visit? -no    2. Have you seen or consulted any other health care providers outside of the [address] [place] [address] since your last visit?   Include any pap smears or colon screening.-no

## 2024-09-12 ENCOUNTER — OFFICE VISIT (OUTPATIENT)
Facility: CLINIC | Age: 34
End: 2024-09-12
Payer: COMMERCIAL

## 2024-09-12 VITALS
HEIGHT: 60 IN | OXYGEN SATURATION: 98 % | WEIGHT: 126 LBS | HEART RATE: 75 BPM | RESPIRATION RATE: 14 BRPM | BODY MASS INDEX: 24.74 KG/M2 | DIASTOLIC BLOOD PRESSURE: 67 MMHG | TEMPERATURE: 98.2 F | SYSTOLIC BLOOD PRESSURE: 112 MMHG

## 2024-09-12 DIAGNOSIS — Z13.1 DIABETES MELLITUS SCREENING: ICD-10-CM

## 2024-09-12 DIAGNOSIS — Z86.32 HISTORY OF GESTATIONAL DIABETES: ICD-10-CM

## 2024-09-12 DIAGNOSIS — N92.0 MENORRHAGIA WITH REGULAR CYCLE: ICD-10-CM

## 2024-09-12 DIAGNOSIS — F33.0 MILD EPISODE OF RECURRENT MAJOR DEPRESSIVE DISORDER (HCC): Primary | ICD-10-CM

## 2024-09-12 DIAGNOSIS — Z13.220 LIPID SCREENING: ICD-10-CM

## 2024-09-12 DIAGNOSIS — E55.9 VITAMIN D DEFICIENCY: ICD-10-CM

## 2024-09-12 DIAGNOSIS — Z90.49 HISTORY OF CHOLECYSTECTOMY: ICD-10-CM

## 2024-09-12 PROBLEM — K80.20 SYMPTOMATIC CHOLELITHIASIS: Status: RESOLVED | Noted: 2019-07-30 | Resolved: 2024-09-12

## 2024-09-12 PROBLEM — O24.410 DIET CONTROLLED GESTATIONAL DIABETES MELLITUS (GDM), ANTEPARTUM: Status: RESOLVED | Noted: 2018-02-20 | Resolved: 2024-09-12

## 2024-09-12 PROBLEM — F41.9 ANXIETY: Status: RESOLVED | Noted: 2019-07-30 | Resolved: 2024-09-12

## 2024-09-12 PROBLEM — F32.A DEPRESSION: Status: RESOLVED | Noted: 2019-07-30 | Resolved: 2024-09-12

## 2024-09-12 PROCEDURE — 99204 OFFICE O/P NEW MOD 45 MIN: CPT | Performed by: STUDENT IN AN ORGANIZED HEALTH CARE EDUCATION/TRAINING PROGRAM

## 2024-09-12 SDOH — ECONOMIC STABILITY: INCOME INSECURITY: HOW HARD IS IT FOR YOU TO PAY FOR THE VERY BASICS LIKE FOOD, HOUSING, MEDICAL CARE, AND HEATING?: PATIENT DECLINED

## 2024-09-12 SDOH — ECONOMIC STABILITY: FOOD INSECURITY: WITHIN THE PAST 12 MONTHS, YOU WORRIED THAT YOUR FOOD WOULD RUN OUT BEFORE YOU GOT MONEY TO BUY MORE.: PATIENT DECLINED

## 2024-09-12 SDOH — ECONOMIC STABILITY: FOOD INSECURITY: WITHIN THE PAST 12 MONTHS, THE FOOD YOU BOUGHT JUST DIDN'T LAST AND YOU DIDN'T HAVE MONEY TO GET MORE.: PATIENT DECLINED

## 2024-09-12 ASSESSMENT — PATIENT HEALTH QUESTIONNAIRE - PHQ9
SUM OF ALL RESPONSES TO PHQ QUESTIONS 1-9: 3
SUM OF ALL RESPONSES TO PHQ QUESTIONS 1-9: 3
2. FEELING DOWN, DEPRESSED OR HOPELESS: NEARLY EVERY DAY
DEPRESSION UNABLE TO ASSESS: FUNCTIONAL CAPACITY MOTIVATION LIMITS ACCURACY
SUM OF ALL RESPONSES TO PHQ QUESTIONS 1-9: 3
SUM OF ALL RESPONSES TO PHQ QUESTIONS 1-9: 3

## 2024-09-13 LAB
25(OH)D3+25(OH)D2 SERPL-MCNC: 46.4 NG/ML (ref 30–100)
ALBUMIN SERPL-MCNC: 4.9 G/DL (ref 3.9–4.9)
ALP SERPL-CCNC: 77 IU/L (ref 44–121)
ALT SERPL-CCNC: 11 IU/L (ref 0–32)
AST SERPL-CCNC: 17 IU/L (ref 0–40)
BASOPHILS # BLD AUTO: 0 X10E3/UL (ref 0–0.2)
BASOPHILS NFR BLD AUTO: 1 %
BILIRUB SERPL-MCNC: 0.3 MG/DL (ref 0–1.2)
BUN SERPL-MCNC: 13 MG/DL (ref 6–20)
BUN/CREAT SERPL: 24 (ref 9–23)
CALCIUM SERPL-MCNC: 9.3 MG/DL (ref 8.7–10.2)
CHLORIDE SERPL-SCNC: 102 MMOL/L (ref 96–106)
CHOLEST SERPL-MCNC: 192 MG/DL (ref 100–199)
CO2 SERPL-SCNC: 23 MMOL/L (ref 20–29)
CREAT SERPL-MCNC: 0.54 MG/DL (ref 0.57–1)
EGFRCR SERPLBLD CKD-EPI 2021: 125 ML/MIN/1.73
EOSINOPHIL # BLD AUTO: 0.1 X10E3/UL (ref 0–0.4)
EOSINOPHIL NFR BLD AUTO: 2 %
ERYTHROCYTE [DISTWIDTH] IN BLOOD BY AUTOMATED COUNT: 12.8 % (ref 11.7–15.4)
FERRITIN SERPL-MCNC: 36 NG/ML (ref 15–150)
GLOBULIN SER CALC-MCNC: 2.4 G/DL (ref 1.5–4.5)
GLUCOSE SERPL-MCNC: 86 MG/DL (ref 70–99)
HBA1C MFR BLD: 5.5 % (ref 4.8–5.6)
HCT VFR BLD AUTO: 38.7 % (ref 34–46.6)
HDLC SERPL-MCNC: 60 MG/DL
HGB BLD-MCNC: 12.5 G/DL (ref 11.1–15.9)
IMM GRANULOCYTES # BLD AUTO: 0 X10E3/UL (ref 0–0.1)
IMM GRANULOCYTES NFR BLD AUTO: 0 %
IRON SATN MFR SERPL: 37 % (ref 15–55)
IRON SERPL-MCNC: 105 UG/DL (ref 27–159)
LDLC SERPL CALC-MCNC: 113 MG/DL (ref 0–99)
LYMPHOCYTES # BLD AUTO: 1.8 X10E3/UL (ref 0.7–3.1)
LYMPHOCYTES NFR BLD AUTO: 33 %
MCH RBC QN AUTO: 29.6 PG (ref 26.6–33)
MCHC RBC AUTO-ENTMCNC: 32.3 G/DL (ref 31.5–35.7)
MCV RBC AUTO: 92 FL (ref 79–97)
MONOCYTES # BLD AUTO: 0.5 X10E3/UL (ref 0.1–0.9)
MONOCYTES NFR BLD AUTO: 9 %
NEUTROPHILS # BLD AUTO: 2.9 X10E3/UL (ref 1.4–7)
NEUTROPHILS NFR BLD AUTO: 55 %
PLATELET # BLD AUTO: 278 X10E3/UL (ref 150–450)
POTASSIUM SERPL-SCNC: 4.1 MMOL/L (ref 3.5–5.2)
PROT SERPL-MCNC: 7.3 G/DL (ref 6–8.5)
RBC # BLD AUTO: 4.23 X10E6/UL (ref 3.77–5.28)
SODIUM SERPL-SCNC: 142 MMOL/L (ref 134–144)
TIBC SERPL-MCNC: 287 UG/DL (ref 250–450)
TRIGL SERPL-MCNC: 107 MG/DL (ref 0–149)
TSH SERPL DL<=0.005 MIU/L-ACNC: 1.14 UIU/ML (ref 0.45–4.5)
UIBC SERPL-MCNC: 182 UG/DL (ref 131–425)
VLDLC SERPL CALC-MCNC: 19 MG/DL (ref 5–40)
WBC # BLD AUTO: 5.3 X10E3/UL (ref 3.4–10.8)

## 2025-02-11 ENCOUNTER — OFFICE VISIT (OUTPATIENT)
Facility: CLINIC | Age: 35
End: 2025-02-11
Payer: COMMERCIAL

## 2025-02-11 VITALS
RESPIRATION RATE: 18 BRPM | BODY MASS INDEX: 24.35 KG/M2 | HEART RATE: 67 BPM | DIASTOLIC BLOOD PRESSURE: 69 MMHG | WEIGHT: 124 LBS | HEIGHT: 60 IN | SYSTOLIC BLOOD PRESSURE: 122 MMHG | TEMPERATURE: 97.7 F

## 2025-02-11 DIAGNOSIS — Z00.00 ENCOUNTER FOR WELL ADULT EXAM WITHOUT ABNORMAL FINDINGS: Primary | ICD-10-CM

## 2025-02-11 PROCEDURE — 99395 PREV VISIT EST AGE 18-39: CPT | Performed by: STUDENT IN AN ORGANIZED HEALTH CARE EDUCATION/TRAINING PROGRAM

## 2025-02-11 SDOH — ECONOMIC STABILITY: FOOD INSECURITY: WITHIN THE PAST 12 MONTHS, YOU WORRIED THAT YOUR FOOD WOULD RUN OUT BEFORE YOU GOT MONEY TO BUY MORE.: NEVER TRUE

## 2025-02-11 SDOH — ECONOMIC STABILITY: FOOD INSECURITY: WITHIN THE PAST 12 MONTHS, THE FOOD YOU BOUGHT JUST DIDN'T LAST AND YOU DIDN'T HAVE MONEY TO GET MORE.: NEVER TRUE

## 2025-02-11 ASSESSMENT — PATIENT HEALTH QUESTIONNAIRE - PHQ9
8. MOVING OR SPEAKING SO SLOWLY THAT OTHER PEOPLE COULD HAVE NOTICED. OR THE OPPOSITE, BEING SO FIGETY OR RESTLESS THAT YOU HAVE BEEN MOVING AROUND A LOT MORE THAN USUAL: NOT AT ALL
10. IF YOU CHECKED OFF ANY PROBLEMS, HOW DIFFICULT HAVE THESE PROBLEMS MADE IT FOR YOU TO DO YOUR WORK, TAKE CARE OF THINGS AT HOME, OR GET ALONG WITH OTHER PEOPLE: SOMEWHAT DIFFICULT
SUM OF ALL RESPONSES TO PHQ QUESTIONS 1-9: 5
SUM OF ALL RESPONSES TO PHQ QUESTIONS 1-9: 5
5. POOR APPETITE OR OVEREATING: SEVERAL DAYS
4. FEELING TIRED OR HAVING LITTLE ENERGY: SEVERAL DAYS
7. TROUBLE CONCENTRATING ON THINGS, SUCH AS READING THE NEWSPAPER OR WATCHING TELEVISION: NOT AT ALL
9. THOUGHTS THAT YOU WOULD BE BETTER OFF DEAD, OR OF HURTING YOURSELF: NOT AT ALL
SUM OF ALL RESPONSES TO PHQ QUESTIONS 1-9: 5
6. FEELING BAD ABOUT YOURSELF - OR THAT YOU ARE A FAILURE OR HAVE LET YOURSELF OR YOUR FAMILY DOWN: NOT AT ALL
3. TROUBLE FALLING OR STAYING ASLEEP: NOT AT ALL
SUM OF ALL RESPONSES TO PHQ QUESTIONS 1-9: 5
2. FEELING DOWN, DEPRESSED OR HOPELESS: NEARLY EVERY DAY

## 2025-02-11 NOTE — PROGRESS NOTES
Well Adult Note  Name: Gillian Davis Today’s Date: 2025   MRN: [id number] Sex: Female   Age: 35 y.o. Ethnicity:  /    : 1990 Race:  /       Gillian Davis is here for a well adult exam and adjustment disorder with depression follow up.          Assessment & Plan  1. Adjustment Disorder  Her depressive symptoms have shown significant improvement since the initial consultation. She was advised to consider the use of over-the-counter melatonin at a reduced dosage of 2.5 mg to aid in sleep regulation when she is switching from night shift to day shift later this week for good, given her history of tachycardia with higher doses. Alternatively, she may opt to abstain from its use and monitor her body's response.    2. Health maintenance.  Her recent lab results were within normal limits. She had a Pap smear conducted in  by [facility]. She was encouraged to engage in moderate-intensity exercise for a minimum of 150 minutes per week. She was also advised to set achievable fitness goals to prevent potential injuries. She was instructed to report any rapid changes in skin spots or any unusual appearances. Seeing a dentist regularly  A request will be sent to [facility] to update her Pap smear records.    Follow-up  The patient is scheduled for a routine annual visit in 1 year, or earlier if necessary.    Encounter for well adult exam without abnormal findings    Lab Results   Component Value Date    CHOL 192 2024    TRIG 107 2024    HDL 60 2024     (H) 2024    VLDL 19 2024     Hemoglobin A1C   Date Value Ref Range Status   2024 5.5 4.8 - 5.6 % Final     Comment:                 Prediabetes: 5.7 - 6.4           Diabetes: >6.4           Glycemic control for adults with diabetes: <7.0       Lab Results   Component Value Date    WBC 5.3 2024    HGB 12.5 2024    HCT 38.7 2024    MCV 92 2024

## 2025-02-11 NOTE — PROGRESS NOTES
\"Have you been to the ER, urgent care clinic since your last visit?  Hospitalized since your last visit?\"    NO    “Have you seen or consulted any other health care providers outside our system since your last visit?”    NO     “Have you had a pap smear?”    Yes,[facility]    Chief Complaint   Patient presents with    3 Month Follow-Up       /69 (Site: Right Upper Arm, Position: Sitting, Cuff Size: Medium Adult)   Pulse 67   Temp 97.7 °F (36.5 °C) (Temporal)   Resp 18   Ht 1.524 m (5')   Wt 56.2 kg (124 lb)   BMI 24.22 kg/m²